# Patient Record
Sex: FEMALE | Race: WHITE | NOT HISPANIC OR LATINO | Employment: FULL TIME | URBAN - METROPOLITAN AREA
[De-identification: names, ages, dates, MRNs, and addresses within clinical notes are randomized per-mention and may not be internally consistent; named-entity substitution may affect disease eponyms.]

---

## 2017-05-22 ENCOUNTER — HOSPITAL ENCOUNTER (EMERGENCY)
Facility: CLINIC | Age: 27
Discharge: HOME | End: 2017-05-22
Attending: EMERGENCY MEDICINE | Admitting: EMERGENCY MEDICINE
Payer: COMMERCIAL

## 2017-05-22 VITALS
WEIGHT: 125 LBS | RESPIRATION RATE: 16 BRPM | HEIGHT: 61 IN | DIASTOLIC BLOOD PRESSURE: 78 MMHG | TEMPERATURE: 97.9 F | HEART RATE: 70 BPM | OXYGEN SATURATION: 99 % | BODY MASS INDEX: 23.6 KG/M2 | SYSTOLIC BLOOD PRESSURE: 118 MMHG

## 2017-05-22 DIAGNOSIS — R10.13 MIDEPIGASTRIC PAIN: ICD-10-CM

## 2017-05-22 LAB
ALBUMIN SERPL-MCNC: 3.9 G/DL (ref 3.4–5)
ALP SERPL-CCNC: 60 U/L (ref 40–150)
ALT SERPL W P-5'-P-CCNC: 17 U/L (ref 0–50)
ANION GAP SERPL CALCULATED.3IONS-SCNC: 6 MMOL/L (ref 3–14)
AST SERPL W P-5'-P-CCNC: 14 U/L (ref 0–45)
BASOPHILS # BLD AUTO: 0 10E9/L (ref 0–0.2)
BASOPHILS NFR BLD AUTO: 0.4 %
BILIRUB SERPL-MCNC: 0.2 MG/DL (ref 0.2–1.3)
BUN SERPL-MCNC: 11 MG/DL (ref 7–30)
CALCIUM SERPL-MCNC: 8.8 MG/DL (ref 8.5–10.1)
CHLORIDE SERPL-SCNC: 107 MMOL/L (ref 94–109)
CO2 SERPL-SCNC: 27 MMOL/L (ref 20–32)
CREAT SERPL-MCNC: 0.63 MG/DL (ref 0.52–1.04)
DIFFERENTIAL METHOD BLD: NORMAL
EOSINOPHIL # BLD AUTO: 0.5 10E9/L (ref 0–0.7)
EOSINOPHIL NFR BLD AUTO: 5.3 %
ERYTHROCYTE [DISTWIDTH] IN BLOOD BY AUTOMATED COUNT: 12.8 % (ref 10–15)
GFR SERPL CREATININE-BSD FRML MDRD: NORMAL ML/MIN/1.7M2
GLUCOSE SERPL-MCNC: 90 MG/DL (ref 70–99)
HCT VFR BLD AUTO: 39.4 % (ref 35–47)
HGB BLD-MCNC: 13.2 G/DL (ref 11.7–15.7)
IMM GRANULOCYTES # BLD: 0 10E9/L (ref 0–0.4)
IMM GRANULOCYTES NFR BLD: 0.1 %
LIPASE SERPL-CCNC: 111 U/L (ref 73–393)
LYMPHOCYTES # BLD AUTO: 2.8 10E9/L (ref 0.8–5.3)
LYMPHOCYTES NFR BLD AUTO: 27.8 %
MCH RBC QN AUTO: 29.8 PG (ref 26.5–33)
MCHC RBC AUTO-ENTMCNC: 33.5 G/DL (ref 31.5–36.5)
MCV RBC AUTO: 89 FL (ref 78–100)
MONOCYTES # BLD AUTO: 0.7 10E9/L (ref 0–1.3)
MONOCYTES NFR BLD AUTO: 6.7 %
NEUTROPHILS # BLD AUTO: 6 10E9/L (ref 1.6–8.3)
NEUTROPHILS NFR BLD AUTO: 59.7 %
PLATELET # BLD AUTO: 265 10E9/L (ref 150–450)
POTASSIUM SERPL-SCNC: 3.9 MMOL/L (ref 3.4–5.3)
PROT SERPL-MCNC: 7.7 G/DL (ref 6.8–8.8)
RBC # BLD AUTO: 4.43 10E12/L (ref 3.8–5.2)
SODIUM SERPL-SCNC: 140 MMOL/L (ref 133–144)
WBC # BLD AUTO: 10.1 10E9/L (ref 4–11)

## 2017-05-22 PROCEDURE — 99283 EMERGENCY DEPT VISIT LOW MDM: CPT | Mod: 25

## 2017-05-22 PROCEDURE — 99284 EMERGENCY DEPT VISIT MOD MDM: CPT | Performed by: EMERGENCY MEDICINE

## 2017-05-22 PROCEDURE — 83690 ASSAY OF LIPASE: CPT | Performed by: EMERGENCY MEDICINE

## 2017-05-22 PROCEDURE — 25000132 ZZH RX MED GY IP 250 OP 250 PS 637: Performed by: EMERGENCY MEDICINE

## 2017-05-22 PROCEDURE — 85025 COMPLETE CBC W/AUTO DIFF WBC: CPT | Performed by: EMERGENCY MEDICINE

## 2017-05-22 PROCEDURE — 96360 HYDRATION IV INFUSION INIT: CPT

## 2017-05-22 PROCEDURE — 25000125 ZZHC RX 250: Performed by: EMERGENCY MEDICINE

## 2017-05-22 PROCEDURE — 80053 COMPREHEN METABOLIC PANEL: CPT | Performed by: EMERGENCY MEDICINE

## 2017-05-22 PROCEDURE — 25000128 H RX IP 250 OP 636: Performed by: EMERGENCY MEDICINE

## 2017-05-22 RX ORDER — SODIUM CHLORIDE 9 MG/ML
1000 INJECTION, SOLUTION INTRAVENOUS CONTINUOUS
Status: DISCONTINUED | OUTPATIENT
Start: 2017-05-22 | End: 2017-05-22 | Stop reason: HOSPADM

## 2017-05-22 RX ORDER — SUCRALFATE 1 G/1
1 TABLET ORAL 4 TIMES DAILY
Qty: 40 TABLET | Refills: 3 | Status: SHIPPED | OUTPATIENT
Start: 2017-05-22 | End: 2023-05-16

## 2017-05-22 RX ADMIN — LIDOCAINE HYDROCHLORIDE 30 ML: 20 SOLUTION ORAL; TOPICAL at 14:28

## 2017-05-22 RX ADMIN — SODIUM CHLORIDE 1000 ML: 9 INJECTION, SOLUTION INTRAVENOUS at 14:28

## 2017-05-22 ASSESSMENT — ENCOUNTER SYMPTOMS
ABDOMINAL PAIN: 1
FLANK PAIN: 0
FEVER: 0
CHILLS: 0
DIARRHEA: 0
VOMITING: 0

## 2017-05-22 NOTE — ED AVS SNAPSHOT
[facility] Emergency Department    [address] [facility] [address]    Phone:  [phone]    Fax:  [phone]                                       Pao Wood   MRN: [id number]    Department:  [facility] Emergency Department   Date of Visit:  5/22/2017           After Visit Summary Signature Page     I have received my discharge instructions, and my questions have been answered. I have discussed any challenges I see with this plan with the nurse or doctor.    ..........................................................................................................................................  Patient/Patient Representative Signature      ..........................................................................................................................................  Patient Representative Print Name and Relationship to Patient    ..................................................               ................................................  Date                                            Time    ..........................................................................................................................................  Reviewed by Signature/Title    ...................................................              ..............................................  Date                                                            Time

## 2017-05-22 NOTE — ED AVS SNAPSHOT
[facility] Emergency Department    [address] [place] [address]    Phone:  [phone]    Fax:  [phone]                                       Pao Wood   MRN: [id number]    Department:  [facility] Emergency Department   Date of Visit:  5/22/2017           Patient Information     Date Of Birth          1990        Your diagnoses for this visit were:     Midepigastric pain        You were seen by Sixto Salas MD.      Follow-up Information     Follow up with Clinic, [facility].    Why:  As needed    Contact information:    [address] [facility] [place] [address]  [phone]          Discharge Instructions         Epigastric Pain (Uncertain Cause)    Epigastric pain can be a sign of disease in the upper abdomen. Common causes include:    Acid reflux (stomach acid flowing up into the esophagus)    Gastritis (irritation of the stomach lining)    Peptic Ulcer Disease    Inflammation of the pancreas    Gallstone    Infection in the gallbladder  Pain may be dull or burning. It may spread upward to the chest or to the back. There may be other symptoms such as belching, bloating, cramps or hunger pains. There may be weight loss or poor appetite, nausea or vomiting.  Since the diagnosis of your pain is not certain yet, further tests may sometimes be needed. Sometimes the doctor will treat you for the most likely condition to see if there is improvement before doing further tests.  Home care  Medicines    Antacids help neutralize the normal acids in your stomach. Examples are Maalox, Mylanta, Rolaids, and Tums. If you don t like the liquid, you can also try a chewable one. You may find one works better than another for you. Overuse can cause diarrhea or constipation.    Acid blockers (H2 blockers) decrease acid production. Examples are cimetidine (Tagamet), famotidine (Pepcid) and ranitidine (Zantac).    Acid inhibitors (PPIs) decrease acid production in a  different way than the blockers. You may find they work better, but can take a little longer to take effect.  Examples are omeprazole (Prilosec), lansoprazole (Prevacid), pantoprazole (Protonix), rabeprazole (Aciphex), and esomeprazole (Nexium).    Take an antacid 30-60 minutes after eating and at bedtime, but not at the same time as an acid blocker.    Try not to take NSAIDs. Aspirin may also cause problems, but if taking it for your heart or other medical reasons, talk to your doctor before stopping it; you do not want to cause a worse problem, like a heart attack or stroke.  Diet    If certain foods seem to cause your spasm, try to avoid them.     Eat slowly and chew food well before swallowing. Symptoms of gastritis can be worsened by certain foods. Limit or avoid fatty, fried, and spicy foods, as well as coffee, chocolate, mint, and foods with high acid content such as tomatoes and citrus fruit and juices (orange, grapefruit, lemon).    Avoid alcohol, caffeine, and tobacco, which can delay healing and worsen your problem.    Try eating smaller meals with snacks in between  Follow-up care  Follow up with your healthcare provider or as advised.  When to seek medical advice  Call your healthcare provider right away if any of the following occur:    Stomach pain worsens or moves to the right lower part of the abdomen    Chest pain appears, or if it worsens or spreads to the chest, back, neck, shoulder, or arm    Frequent vomiting (can t keep down liquids)    Blood in the stool or vomit (red or black color)    Feeling weak or dizzy, fainting, or having trouble breathing    Fever of 100.4 F (38 C) or higher, or as directed by your healthcare provider    Abdominal swelling                6319-4761 [employer]. [address] [place] [address], [place] [address]. All rights reserved. This information is not intended as a substitute for professional medical care. Always follow your healthcare professional's  instructions.          24 Hour Appointment Hotline       To make an appointment at any [facility], call [phone] ([phone]). If you don't have a family doctor or clinic, we will help you find one. [facility] are conveniently located to serve the needs of you and your family.             Review of your medicines      START taking        Dose / Directions Last dose taken    omeprazole 20 MG CR capsule   Commonly known as:  priLOSEC   Dose:  20 mg   Quantity:  30 capsule        Take 1 capsule (20 mg) by mouth daily   Refills:  0        sucralfate 1 GM tablet   Commonly known as:  CARAFATE   Dose:  1 g   Quantity:  40 tablet        Take 1 tablet (1 g) by mouth 4 times daily   Refills:  3          Our records show that you are taking the medicines listed below. If these are incorrect, please call your family doctor or clinic.        Dose / Directions Last dose taken    CELEXA PO   Dose:  20 mg        Take 20 mg by mouth daily   Refills:  0                Prescriptions were sent or printed at these locations (2 Prescriptions)                   [facility] [address] [address]    Telephone:  [phone]   Fax:  [phone]   Hours:                  E-Prescribed (2 of 2)         omeprazole (PRILOSEC) 20 MG CR capsule               sucralfate (CARAFATE) 1 GM tablet                Procedures and tests performed during your visit     CBC with platelets differential    Comprehensive metabolic panel    Lipase      Orders Needing Specimen Collection     None      Pending Results     No orders found from 5/20/2017 to 5/23/2017.            Pending Culture Results     No orders found from 5/20/2017 to 5/23/2017.            Pending Results Instructions     If you had any lab results that were not finalized at the time of your Discharge, you can call the ED Lab Result RN at [phone]. You will be contacted by this team for any positive Lab  results or changes in treatment. The nurses are available 7 days a week from 10A to 6:30P.  You can leave a message 24 hours per day and they will return your call.        Test Results From Your Hospital Stay        5/22/2017  3:05 PM      Component Results     Component Value Ref Range & Units Status    WBC 10.1 4.0 - 11.0 10e9/L Final    RBC Count 4.43 3.8 - 5.2 10e12/L Final    Hemoglobin 13.2 11.7 - 15.7 g/dL Final    Hematocrit 39.4 35.0 - 47.0 % Final    MCV 89 78 - 100 fl Final    MCH 29.8 26.5 - 33.0 pg Final    MCHC 33.5 31.5 - 36.5 g/dL Final    RDW 12.8 10.0 - 15.0 % Final    Platelet Count 265 150 - 450 10e9/L Final    Diff Method Automated Method  Final    % Neutrophils 59.7 % Final    % Lymphocytes 27.8 % Final    % Monocytes 6.7 % Final    % Eosinophils 5.3 % Final    % Basophils 0.4 % Final    % Immature Granulocytes 0.1 % Final    Absolute Neutrophil 6.0 1.6 - 8.3 10e9/L Final    Absolute Lymphocytes 2.8 0.8 - 5.3 10e9/L Final    Absolute Monocytes 0.7 0.0 - 1.3 10e9/L Final    Absolute Eosinophils 0.5 0.0 - 0.7 10e9/L Final    Absolute Basophils 0.0 0.0 - 0.2 10e9/L Final    Abs Immature Granulocytes 0.0 0 - 0.4 10e9/L Final         5/22/2017  3:19 PM      Component Results     Component Value Ref Range & Units Status    Sodium 140 133 - 144 mmol/L Final    Potassium 3.9 3.4 - 5.3 mmol/L Final    Chloride 107 94 - 109 mmol/L Final    Carbon Dioxide 27 20 - 32 mmol/L Final    Anion Gap 6 3 - 14 mmol/L Final    Glucose 90 70 - 99 mg/dL Final    Urea Nitrogen 11 7 - 30 mg/dL Final    Creatinine 0.63 0.52 - 1.04 mg/dL Final    GFR Estimate >90  Non  GFR Calc   >60 mL/min/1.7m2 Final    GFR Estimate If Black >90   GFR Calc   >60 mL/min/1.7m2 Final    Calcium 8.8 8.5 - 10.1 mg/dL Final    Bilirubin Total 0.2 0.2 - 1.3 mg/dL Final    Albumin 3.9 3.4 - 5.0 g/dL Final    Protein Total 7.7 6.8 - 8.8 g/dL Final    Alkaline Phosphatase 60 40 - 150 U/L Final    ALT 17 0 - 50 U/L  "Final    AST 14 0 - 45 U/L Final         2017  3:18 PM      Component Results     Component Value Ref Range & Units Status    Lipase 111 73 - 393 U/L Final                Thank you for choosing [facility]       Thank you for choosing [facility] for your care. Our goal is always to provide you with excellent care. Hearing back from our patients is one way we can continue to improve our services. Please take a few minutes to complete the written survey that you may receive in the mail after you visit with us. Thank you!        [employer] Information     [employer] lets you send messages to your doctor, view your test results, renew your prescriptions, schedule appointments and more. To sign up, go to [email or web address] . Click on \"Log in\" on the left side of the screen, which will take you to the Welcome page. Then click on \"Sign up Now\" on the right side of the page.     You will be asked to enter the access code listed below, as well as some personal information. Please follow the directions to create your username and password.     Your access code is: [email or web address]  Expires: 2017  3:31 PM     Your access code will  in 90 days. If you need help or a new code, please call your [facility] clinic or [phone].        Care EveryWhere ID     This is your Care EveryWhere ID. This could be used by other organizations to access your [facility] medical records  [id number]        After Visit Summary       This is your record. Keep this with you and show to your community pharmacist(s) and doctor(s) at your next visit.                  "

## 2017-05-22 NOTE — DISCHARGE INSTRUCTIONS
Epigastric Pain (Uncertain Cause)    Epigastric pain can be a sign of disease in the upper abdomen. Common causes include:    Acid reflux (stomach acid flowing up into the esophagus)    Gastritis (irritation of the stomach lining)    Peptic Ulcer Disease    Inflammation of the pancreas    Gallstone    Infection in the gallbladder  Pain may be dull or burning. It may spread upward to the chest or to the back. There may be other symptoms such as belching, bloating, cramps or hunger pains. There may be weight loss or poor appetite, nausea or vomiting.  Since the diagnosis of your pain is not certain yet, further tests may sometimes be needed. Sometimes the doctor will treat you for the most likely condition to see if there is improvement before doing further tests.  Home care  Medicines    Antacids help neutralize the normal acids in your stomach. Examples are Maalox, Mylanta, Rolaids, and Tums. If you don t like the liquid, you can also try a chewable one. You may find one works better than another for you. Overuse can cause diarrhea or constipation.    Acid blockers (H2 blockers) decrease acid production. Examples are cimetidine (Tagamet), famotidine (Pepcid) and ranitidine (Zantac).    Acid inhibitors (PPIs) decrease acid production in a different way than the blockers. You may find they work better, but can take a little longer to take effect.  Examples are omeprazole (Prilosec), lansoprazole (Prevacid), pantoprazole (Protonix), rabeprazole (Aciphex), and esomeprazole (Nexium).    Take an antacid 30-60 minutes after eating and at bedtime, but not at the same time as an acid blocker.    Try not to take NSAIDs. Aspirin may also cause problems, but if taking it for your heart or other medical reasons, talk to your doctor before stopping it; you do not want to cause a worse problem, like a heart attack or stroke.  Diet    If certain foods seem to cause your spasm, try to avoid them.     Eat slowly and chew food well  before swallowing. Symptoms of gastritis can be worsened by certain foods. Limit or avoid fatty, fried, and spicy foods, as well as coffee, chocolate, mint, and foods with high acid content such as tomatoes and citrus fruit and juices (orange, grapefruit, lemon).    Avoid alcohol, caffeine, and tobacco, which can delay healing and worsen your problem.    Try eating smaller meals with snacks in between  Follow-up care  Follow up with your healthcare provider or as advised.  When to seek medical advice  Call your healthcare provider right away if any of the following occur:    Stomach pain worsens or moves to the right lower part of the abdomen    Chest pain appears, or if it worsens or spreads to the chest, back, neck, shoulder, or arm    Frequent vomiting (can t keep down liquids)    Blood in the stool or vomit (red or black color)    Feeling weak or dizzy, fainting, or having trouble breathing    Fever of 100.4 F (38 C) or higher, or as directed by your healthcare provider    Abdominal swelling                8289-7671 [employer]. [address] [place] [address], [place] [address]. All rights reserved. This information is not intended as a substitute for professional medical care. Always follow your healthcare professional's instructions.

## 2017-05-22 NOTE — ED PROVIDER NOTES
History     Chief Complaint   Patient presents with     Abdominal Pain     upper abd      HPI  Pao Wood is a 27 year old female with a history of acid reflux and kidney infections who presents with abdominal pain. The patient developed abdominal pain two days ago with shortness of breath that is aggravated with deep breathes. Ibuprofen and Zantac doesn't aggravate or alleviate her symptoms. She has had a cholecystectomy but the sensation she is having mimics the same pain she had from her gallbladder in the past.   The patient denies vomiting, diarrhea, and flank pain. No fevers chills or shakes. She drinks a lot of caffeine daily and smokes approximately 10 cigarettes a day but denies any alcohol use. No chances of pregnancy. Family history of GERD and gastritis.       History reviewed. No pertinent past medical history.  There is no problem list on file for this patient.    Current Facility-Administered Medications   Medication     0.9% sodium chloride infusion     Current Outpatient Prescriptions   Medication     Citalopram Hydrobromide (CELEXA PO)     omeprazole (PRILOSEC) 20 MG CR capsule     sucralfate (CARAFATE) 1 GM tablet        Allergies   Allergen Reactions     Acetaminophen Nausea     Fever and Nausea     Aspirin Nausea     Nausea and Fever     Social History     Social History     Marital status: Single     Spouse name: N/A     Number of children: N/A     Years of education: N/A     Occupational History     Not on file.     Social History Main Topics     Smoking status: Current Every Day Smoker     Packs/day: 0.50     Smokeless tobacco: Not on file     Alcohol use No     Drug use: No     Sexual activity: Not on file     Other Topics Concern     Not on file     Social History Narrative     No family history on file.    I have reviewed the Medications, Allergies, Past Medical and Surgical History, and Social History in the Epic system.    Review of Systems   Constitutional: Negative for chills and  "fever.   Gastrointestinal: Positive for abdominal pain. Negative for diarrhea and vomiting.   Genitourinary: Negative for flank pain.     All other systems reviewed and are negative.    Physical Exam   BP: 113/80  Pulse: 74  Temp: 97.9  F (36.6  C)  Resp: 16  Height: 154.9 cm (5' 1\")  Weight: 56.7 kg (125 lb)    Physical Exam Gen. alert cooperative female in mild to moderate distress.  HEENT shows no scleral icterus.  Mucous is moist.  Neck is supple.  Lungs are clear without adventitious sounds.  Cardiac regular without murmur.  Back there is no CVA tenderness.  Abdomen reveals active bowel sounds on palpation shows tenderness in the epigastrium without guarding or rebound.  No organomegaly or masses.  She has intact surgical incisions.  Skin rash from the flank or abdomen.  Legs show no swelling, calf or thigh tenderness, and Homans is negative.    ED Course     ED Course     Procedures             Critical Care time:  none               Labs Ordered and Resulted from Time of ED Arrival Up to the Time of Departure from the ED   CBC WITH PLATELETS DIFFERENTIAL   COMPREHENSIVE METABOLIC PANEL   LIPASE       Results for orders placed or performed during the hospital encounter of 05/22/17 (from the past 24 hour(s))   CBC with platelets differential   Result Value Ref Range    WBC 10.1 4.0 - 11.0 10e9/L    RBC Count 4.43 3.8 - 5.2 10e12/L    Hemoglobin 13.2 11.7 - 15.7 g/dL    Hematocrit 39.4 35.0 - 47.0 %    MCV 89 78 - 100 fl    MCH 29.8 26.5 - 33.0 pg    MCHC 33.5 31.5 - 36.5 g/dL    RDW 12.8 10.0 - 15.0 %    Platelet Count 265 150 - 450 10e9/L    Diff Method Automated Method     % Neutrophils 59.7 %    % Lymphocytes 27.8 %    % Monocytes 6.7 %    % Eosinophils 5.3 %    % Basophils 0.4 %    % Immature Granulocytes 0.1 %    Absolute Neutrophil 6.0 1.6 - 8.3 10e9/L    Absolute Lymphocytes 2.8 0.8 - 5.3 10e9/L    Absolute Monocytes 0.7 0.0 - 1.3 10e9/L    Absolute Eosinophils 0.5 0.0 - 0.7 10e9/L    Absolute Basophils " 0.0 0.0 - 0.2 10e9/L    Abs Immature Granulocytes 0.0 0 - 0.4 10e9/L   Comprehensive metabolic panel   Result Value Ref Range    Sodium 140 133 - 144 mmol/L    Potassium 3.9 3.4 - 5.3 mmol/L    Chloride 107 94 - 109 mmol/L    Carbon Dioxide 27 20 - 32 mmol/L    Anion Gap 6 3 - 14 mmol/L    Glucose 90 70 - 99 mg/dL    Urea Nitrogen 11 7 - 30 mg/dL    Creatinine 0.63 0.52 - 1.04 mg/dL    GFR Estimate >90  Non  GFR Calc   >60 mL/min/1.7m2    GFR Estimate If Black >90   GFR Calc   >60 mL/min/1.7m2    Calcium 8.8 8.5 - 10.1 mg/dL    Bilirubin Total 0.2 0.2 - 1.3 mg/dL    Albumin 3.9 3.4 - 5.0 g/dL    Protein Total 7.7 6.8 - 8.8 g/dL    Alkaline Phosphatase 60 40 - 150 U/L    ALT 17 0 - 50 U/L    AST 14 0 - 45 U/L   Lipase   Result Value Ref Range    Lipase 111 73 - 393 U/L       Medications   0.9% sodium chloride BOLUS (1,000 mLs Intravenous New Bag 5/22/17 1428)     Followed by   0.9% sodium chloride infusion (not administered)   lidocaine (XYLOCAINE) 2 % 15 mL, alum & mag hydroxide-simethicone (MYLANTA ES/MAALOX  ES) 15 mL GI Cocktail (30 mLs Oral Given 5/22/17 1428)       2:00 PM patient assessed.   IV was established and blood was obtained.  Patient was given a GI cocktail.  2:50 PM on recheck patient did have some improvement with the GI cocktail.  Awaiting blood work.  Blood work was unremarkable.  Assessments & Plan (with Medical Decision Making)   Patient is a 26 yo female presents with midepigastric abdominal pain at times radiates to her back.  She states this feels very similar to when she had her gallstones.  She had her gallbladder removed.  She denies other abdominal surgeries except tubal ligation.  She said 3 deliveries.  Currently has no fever or chills.  No chest pain or shortness of breath.  She does state her to take a deep breath.  She's had no cough.  No diarrhea or dark stools.  No vomiting.  Does admit to gastric irritants including ibuprofen, caffeine, and tobacco.   Denies alcohol use.  He is never had endoscopy or colonoscopy.  She tried Tums in the above-mentioned ibuprofen without change in her symptoms.  No change with food intake.  Patient had blood work obtained showed no evidence of pancreatitis, hepatitis or biliary disease.  Normal hemoglobin and white count.  Suspect GERD, gastritis, early ulcer.  Asked her to avoid gastric irritants.  Prilosec 1 tablet daily and a stomach.  Carafate before meals and at bedtime.  Do not take these 2 medicines together.  He is to return to the emergency room for reassessment discussed including uncontrolled pain, vomiting, hematemesis, or dark/bloody stools.  I have reviewed the nursing notes.    I have reviewed the findings, diagnosis, plan and need for follow up with the patient.    New Prescriptions    OMEPRAZOLE (PRILOSEC) 20 MG CR CAPSULE    Take 1 capsule (20 mg) by mouth daily    SUCRALFATE (CARAFATE) 1 GM TABLET    Take 1 tablet (1 g) by mouth 4 times daily       Final diagnoses:   Midepigastric pain     This document serves as a record of the services and decisions personally performed and made by Sixto Salas MD. It was created on HIS/HER behalf by Lety Latham, a trained medical scribe. The creation of this document is based the provider's statements to the medical scribe.  Lety Latham 1:58 PM 5/22/2017    Provider:   The information in this document, created by the medical scribe for me, accurately reflects the services I personally performed and the decisions made by me. I have reviewed and approved this document for accuracy prior to leaving the patient care area.  Sixto Salas MD 1:58 PM 5/22/2017 5/22/2017   [facility] EMERGENCY DEPARTMENT     Sixto Salas MD  05/22/17 0870

## 2017-05-22 NOTE — ED NOTES
Pt states for the last 2-3 days she has had epigastric pain that radiates to the back.  No n/v/d.  No fevers.  Had gallbladder removed 2 years ago.

## 2019-06-21 ENCOUNTER — HOSPITAL ENCOUNTER (EMERGENCY)
Facility: CLINIC | Age: 29
Discharge: HOME | End: 2019-06-21
Attending: NURSE PRACTITIONER | Admitting: NURSE PRACTITIONER
Payer: COMMERCIAL

## 2019-06-21 VITALS
WEIGHT: 130 LBS | RESPIRATION RATE: 18 BRPM | DIASTOLIC BLOOD PRESSURE: 80 MMHG | HEART RATE: 70 BPM | OXYGEN SATURATION: 100 % | HEIGHT: 62 IN | SYSTOLIC BLOOD PRESSURE: 118 MMHG | BODY MASS INDEX: 23.92 KG/M2 | TEMPERATURE: 97.2 F

## 2019-06-21 DIAGNOSIS — H10.33 ACUTE CONJUNCTIVITIS OF BOTH EYES, UNSPECIFIED ACUTE CONJUNCTIVITIS TYPE: ICD-10-CM

## 2019-06-21 DIAGNOSIS — H61.21 IMPACTED CERUMEN OF RIGHT EAR: ICD-10-CM

## 2019-06-21 PROCEDURE — G0463 HOSPITAL OUTPT CLINIC VISIT: HCPCS | Mod: 25 | Performed by: NURSE PRACTITIONER

## 2019-06-21 PROCEDURE — 99214 OFFICE O/P EST MOD 30 MIN: CPT | Mod: Z6 | Performed by: NURSE PRACTITIONER

## 2019-06-21 PROCEDURE — 69209 REMOVE IMPACTED EAR WAX UNI: CPT | Mod: RT | Performed by: NURSE PRACTITIONER

## 2019-06-21 RX ORDER — CITALOPRAM HYDROBROMIDE 20 MG/1
20 TABLET ORAL
COMMUNITY
Start: 2018-08-14 | End: 2019-06-21

## 2019-06-21 RX ORDER — OFLOXACIN 3 MG/ML
1-2 SOLUTION/ DROPS OPHTHALMIC
Qty: 1 BOTTLE | Refills: 0 | Status: SHIPPED | OUTPATIENT
Start: 2019-06-21 | End: 2020-07-02

## 2019-06-21 RX ORDER — IBUPROFEN 200 MG
600 TABLET ORAL
COMMUNITY
Start: 2019-04-24

## 2019-06-21 ASSESSMENT — MIFFLIN-ST. JEOR: SCORE: 1272.93

## 2019-06-21 NOTE — ED AVS SNAPSHOT
[facility] Emergency Department  [address] [address]  Phone:  [phone]  Fax:  [phone]                                    Pao Wood   MRN: [id number]    Department:  [facility] Emergency Department   Date of Visit:  6/21/2019           After Visit Summary Signature Page    I have received my discharge instructions, and my questions have been answered. I have discussed any challenges I see with this plan with the nurse or doctor.    ..........................................................................................................................................  Patient/Patient Representative Signature      ..........................................................................................................................................  Patient Representative Print Name and Relationship to Patient    ..................................................               ................................................  Date                                   Time    ..........................................................................................................................................  Reviewed by Signature/Title    ...................................................              ..............................................  Date                                               Time          22EPIC Rev 08/18

## 2019-06-22 NOTE — ED PROVIDER NOTES
"  History     Chief Complaint   Patient presents with     Conjunctivitis     bilateral.  and R ear pain     HPI  Presents for evaluation for the following:     Eye(s) Problem      Duration: 1 days    Description:  Location: bilateral  Pain: no  Redness: YES  Discharge: YES- bilaterally but started on right and now progressed to left eye    Accompanying signs and symptoms: muffled hearing today and has felt plugged over the last week    History (Trauma, foreign body exposure,): None    Precipitating or alleviating factors (contact use): None    Does not wear contacts or glasses    Therapies tried and outcome: None       Allergies:  Allergies   Allergen Reactions     Acetaminophen Nausea     Fever and Nausea     Aspirin Nausea     Nausea and Fever       Problem List:    There are no active problems to display for this patient.       Past Medical History:    History reviewed. No pertinent past medical history.    Past Surgical History:    History reviewed. No pertinent surgical history.    Family History:    History reviewed. No pertinent family history.    Social History:  Marital Status:  Single [1]  Social History     Tobacco Use     Smoking status: Current Every Day Smoker     Packs/day: 0.50   Substance Use Topics     Alcohol use: No     Drug use: No        Medications:      ibuprofen (ADVIL/MOTRIN) 200 MG tablet   ofloxacin (OCUFLOX) 0.3 % ophthalmic solution   Citalopram Hydrobromide (CELEXA PO)   sucralfate (CARAFATE) 1 GM tablet         Review of Systems  Patient denies fever, aches, chills, sweats, throat pain, vision changes, chest pain, shortness of air, abdominal pain, nausea, vomiting, diarrhea, difficulty urinating, dysuria, dizziness, seizures, speech difficulty and thoughts of harming self.  As mentioned above in the history present illness. All other systems were reviewed and are negative.      Physical Exam   BP: 118/80  Pulse: 70  Temp: 97.2  F (36.2  C)  Resp: 18  Height: 157.5 cm (5' 2\")  Weight: " 59 kg (130 lb)  SpO2: 100 %      Physical Exam  GENERAL APPEARANCE: Alert, no acute distress  EYES: PERRL, EOM normal, conjunctival erythema bilateral, crusting, mattering present bilateral  HENT: Mouth and posterior oropharynx normal, moist mucous membranes, right TM not visualized secondary to cerumen, left TM normal  NECK: No adenopathy,masses or thyromegaly  RESP: lungs clear to auscultation bilaterally  CV: regular rate and rhythm, no murmur, rub or gallop  LYMPHATICS: No cervical, or supraclavicular adenopathy  SKIN: no suspicious lesions or rashes  PSYCHE: mentation appears normal, affect and mood normal    ED Course        Procedures  Cerumen removed from right ear by nurse without difficulty; post removal, TM and ear canal visualized and normal.  Pt reports ear feels normal again.    No results found for this or any previous visit (from the past 24 hour(s)).    Medications - No data to display    Assessments & Plan (with Medical Decision Making)  Pt presents with one day eye problem that started in right eye and has progressed to left eye also.  PT denies foreign body and contact lens use.  Pt denies contacts with similar symptoms.  Pt also has concern of right ear plugged ear sensation and decreased hearing without drainage or pain.  Exam as noted above.  Cerumen removed without complication.  Will treat as bacterial conjunctivitis.  Reviewed instructions for ofloxacin eyedrops.  Patient verbalized understanding regarding all of the above.  Patient discharged in stable condition peer     I have reviewed the nursing notes.    I have reviewed the findings, diagnosis, plan and need for follow up with the patient.       Medication List      Started    ofloxacin 0.3 % ophthalmic solution  Commonly known as:  OCUFLOX  1-2 drops, Both Eyes, EVERY 2 HOURS WHILE AWAKE            Final diagnoses:   Impacted cerumen of right ear   Acute conjunctivitis of both eyes, unspecified acute conjunctivitis type        6/21/2019   [facility] EMERGENCY DEPARTMENT     Shelby Mckee, APRN CNP  06/22/19 0428

## 2019-07-12 ENCOUNTER — APPOINTMENT (OUTPATIENT)
Dept: GENERAL RADIOLOGY | Facility: CLINIC | Age: 29
End: 2019-07-12
Attending: PHYSICIAN ASSISTANT
Payer: COMMERCIAL

## 2019-07-12 ENCOUNTER — HOSPITAL ENCOUNTER (EMERGENCY)
Facility: CLINIC | Age: 29
Discharge: HOME | End: 2019-07-12
Attending: PHYSICIAN ASSISTANT | Admitting: PHYSICIAN ASSISTANT
Payer: COMMERCIAL

## 2019-07-12 VITALS
DIASTOLIC BLOOD PRESSURE: 56 MMHG | RESPIRATION RATE: 16 BRPM | SYSTOLIC BLOOD PRESSURE: 105 MMHG | WEIGHT: 130 LBS | TEMPERATURE: 98.6 F | HEIGHT: 61 IN | BODY MASS INDEX: 24.55 KG/M2 | OXYGEN SATURATION: 98 %

## 2019-07-12 DIAGNOSIS — S69.91XA HAND INJURY, RIGHT, INITIAL ENCOUNTER: ICD-10-CM

## 2019-07-12 PROCEDURE — G0463 HOSPITAL OUTPT CLINIC VISIT: HCPCS | Performed by: PHYSICIAN ASSISTANT

## 2019-07-12 PROCEDURE — 99214 OFFICE O/P EST MOD 30 MIN: CPT | Mod: Z6 | Performed by: PHYSICIAN ASSISTANT

## 2019-07-12 PROCEDURE — 73130 X-RAY EXAM OF HAND: CPT | Mod: RT

## 2019-07-12 ASSESSMENT — MIFFLIN-ST. JEOR: SCORE: 1257.06

## 2019-07-12 NOTE — ED AVS SNAPSHOT
[facility] Emergency Department  [address] [address]  Phone:  [phone]  Fax:  [phone]                                    Pao Wood   MRN: [id number]    Department:  [facility] Emergency Department   Date of Visit:  7/12/2019           After Visit Summary Signature Page    I have received my discharge instructions, and my questions have been answered. I have discussed any challenges I see with this plan with the nurse or doctor.    ..........................................................................................................................................  Patient/Patient Representative Signature      ..........................................................................................................................................  Patient Representative Print Name and Relationship to Patient    ..................................................               ................................................  Date                                   Time    ..........................................................................................................................................  Reviewed by Signature/Title    ...................................................              ..............................................  Date                                               Time          22EPIC Rev 08/18

## 2019-07-12 NOTE — ED PROVIDER NOTES
History     Chief Complaint   Patient presents with     Hand Pain     right hand, punched a metal sign 2 weeks ago     HPI  Pao Wood is a 29 year old female who sustained a right hand injury 2 weeks ago.   Mechanism of injury: patient punched metal sign 2 weeks ago.   Immediate symptoms: immediate pain, immediate swelling, was able to use hand directly after injury, no deformity was noted by the patient, swelling and bruising improved, but pain persists.   Symptoms have been persistent since that time.   Prior history of related problems: no prior problems with this area in the past.  Patient states some tingling to the 5th finger. No numbness, weakness, abrasions or lacerations.     Problem list, Medication list, Allergies, and Medical/Social/Surgical histories reviewed in [facility] and updated as appropriate.      Allergies:  Allergies   Allergen Reactions     Acetaminophen Nausea     Fever and Nausea     Aspirin Nausea     Nausea and Fever       Problem List:    There are no active problems to display for this patient.       Past Medical History:    History reviewed. No pertinent past medical history.    Past Surgical History:    History reviewed. No pertinent surgical history.    Family History:    No family history on file.    Social History:  Marital Status:  Single [1]  Social History     Tobacco Use     Smoking status: Current Every Day Smoker     Packs/day: 0.50   Substance Use Topics     Alcohol use: No     Drug use: No        Medications:      Citalopram Hydrobromide (CELEXA PO)   ibuprofen (ADVIL/MOTRIN) 200 MG tablet   ofloxacin (OCUFLOX) 0.3 % ophthalmic solution   sucralfate (CARAFATE) 1 GM tablet         Review of Systems   Musculoskeletal:        Right hand pain to the lateral side of the hand and 5th finger.    Neurological: Negative for weakness and numbness.   All other systems reviewed and are negative.      Physical Exam   BP: 105/56  Heart Rate: 65  Temp: 98.6  F (37  C)  Resp: 16  Height:  "154.9 cm (5' 1\")  Weight: 59 kg (130 lb)  SpO2: 98 %      Physical Exam   Constitutional: She is oriented to person, place, and time. She appears well-developed and well-nourished. No distress.   Cardiovascular: Intact distal pulses.   Musculoskeletal:        Right wrist: Normal.        Right hand: She exhibits tenderness and bony tenderness (over lateral aspect of the right hand, 5th MCP joint and 5th finger. ). She exhibits normal range of motion, normal two-point discrimination, normal capillary refill, no deformity, no laceration and no swelling. Normal sensation noted. Normal strength noted.        Hands:  Patient with full range of motion of the hand and wrist with and without resistance in all fingers.  No bruising, swelling, skin abrasion or laceration noted and no erythema.   Neurological: She is alert and oriented to person, place, and time. She has normal strength. No sensory deficit. GCS eye subscore is 4. GCS verbal subscore is 5. GCS motor subscore is 6.   Skin: Skin is warm, dry and intact. Capillary refill takes less than 2 seconds. No abrasion, no bruising, no ecchymosis, no petechiae and no rash noted. She is not diaphoretic. No erythema.   Psychiatric: She has a normal mood and affect. Her behavior is normal. Judgment and thought content normal.   Nursing note and vitals reviewed.      ED Course        Procedures              Critical Care time:  none               Results for orders placed or performed during the hospital encounter of 07/12/19   XR Hand Right G/E 3 Views    Narrative    Examination:  XR HAND RT G/E 3 VW    Date:  7/12/2019 6:43 PM     Clinical Information: Subacute trauma. Fifth metacarpal pain.     Comparison: None.    Findings: No fracture or subluxation. Normal joint spacing and  alignment. No intraosseous lesions. No soft tissue abnormality is  evident.      Impression    Impression: Unremarkable right hand..    BISHNU NAVA MD       Medications - No data to " display    Assessments & Plan (with Medical Decision Making)     I have reviewed the nursing notes.    I have reviewed the findings, diagnosis, plan and need for follow up with the patient.   29-year-old female presents the urgent care with persistent right hand pain after she punched a metal sign 2 weeks ago.  See exam findings above.  X-ray obtained in office today with no fracture or subluxation.  Normal joint spacing and alignment.  No intraosseous lesions.  No soft tissue abnormalities evident.  Patient informed of x-ray results and informed to ice, rest, elevate, Tylenol and ibuprofen over-the-counter as needed for pain.  Patient follow-up with primary care doctor or orthopedic specialist in 1 to 2 weeks if symptoms persist or fail to improve.  Patient return sooner if symptoms worsen or change these were discussed with patient and given in discharge paperwork.  Patient discharged in stable condition.       Medication List      There are no discharge medications for this visit.         Final diagnoses:   Hand injury, right, initial encounter       7/12/2019   [facility] EMERGENCY DEPARTMENT     Celsa Schneider PA-C  07/14/19 1151

## 2019-07-13 NOTE — DISCHARGE INSTRUCTIONS
Ice, rest, elevate, Tylenol and ibuprofen over-the-counter as needed for pain.    Patient follow-up with primary care doctor or orthopedic specialist in 1 to 2 weeks if symptoms persist or fail to improve.    X-ray negative for fracture.

## 2019-07-14 ASSESSMENT — ENCOUNTER SYMPTOMS
NUMBNESS: 0
WEAKNESS: 0

## 2020-07-02 ENCOUNTER — VIRTUAL VISIT (OUTPATIENT)
Dept: URGENT CARE | Facility: CLINIC | Age: 30
End: 2020-07-02
Payer: COMMERCIAL

## 2020-07-02 ENCOUNTER — NURSE TRIAGE (OUTPATIENT)
Dept: NURSING | Facility: CLINIC | Age: 30
End: 2020-07-02

## 2020-07-02 DIAGNOSIS — J06.9 UPPER RESPIRATORY TRACT INFECTION, UNSPECIFIED TYPE: ICD-10-CM

## 2020-07-02 DIAGNOSIS — J06.9 UPPER RESPIRATORY TRACT INFECTION, UNSPECIFIED TYPE: Primary | ICD-10-CM

## 2020-07-02 PROCEDURE — 99207 ZZC NO CHARGE NURSE ONLY: CPT

## 2020-07-02 PROCEDURE — U0003 INFECTIOUS AGENT DETECTION BY NUCLEIC ACID (DNA OR RNA); SEVERE ACUTE RESPIRATORY SYNDROME CORONAVIRUS 2 (SARS-COV-2) (CORONAVIRUS DISEASE [COVID-19]), AMPLIFIED PROBE TECHNIQUE, MAKING USE OF HIGH THROUGHPUT TECHNOLOGIES AS DESCRIBED BY CMS-2020-01-R: HCPCS | Performed by: PHYSICIAN ASSISTANT

## 2020-07-02 PROCEDURE — 99213 OFFICE O/P EST LOW 20 MIN: CPT | Mod: 95 | Performed by: PHYSICIAN ASSISTANT

## 2020-07-02 NOTE — LETTER
July 3, 2020        Pao Nina  [address] [address]    This letter provides a written record that you were tested for COVID-19 on 7/2/20.       Your result was negative. This means that we didn t find the virus that causes COVID-19 in your sample. A test may show negative when you do actually have the virus. This can happen when the virus is in the early stages of infection, before you feel illness symptoms.    If you have symptoms   Stay home and away from others (self-isolate) until you meet ALL of the guidelines below:    You ve had no fever--and no medicine that reduces fever--for 3 full days (72 hours). And      Your other symptoms have gotten better. For example, your cough or breathing has improved. And     At least 10 days have passed since your symptoms started.    During this time:    Stay home. Don t go to work, school or anywhere else.     Stay in your own room, including for meals. Use your own bathroom if you can.    Stay away from others in your home. No hugging, kissing or shaking hands. No visitors.    Clean  high touch  surfaces often (doorknobs, counters, handles, etc.). Use a household cleaning spray or wipes. You can find a full list on the EPA website at www.epa.gov/pesticide-registration/list-n-disinfectants-use-against-sars-cov-2.    Cover your mouth and nose with a mask, tissue or washcloth to avoid spreading germs.    Wash your hands and face often with soap and water.    Going back to work  Check with your employer for any guidelines to follow for going back to work.    Employers: This document serves as formal notice that your employee tested negative for COVID-19, as of the testing date shown above.

## 2020-07-02 NOTE — TELEPHONE ENCOUNTER
Pao has a cough and body aches, fever and shortness of breath when walking.  Symptoms started 3 to 4 days ago.      COVID 19 Nurse Triage Plan/Patient Instructions    Please be aware that novel coronavirus (COVID-19) may be circulating in the community. If you develop symptoms such as fever, cough, or SOB or if you have concerns about the presence of another infection including coronavirus (COVID-19), please contact your health care provider or visit [email or web address].     Disposition/Instructions    Patient to schedule a Virtual Visit with provider. Reference Visit Selection Guide.    Thank you for taking steps to prevent the spread of this virus.  o Limit your contact with others.  o Wear a simple mask to cover your cough.  o Wash your hands well and often.    Resources    [facility]: About COVID-19: [email or web address]    CDC: What to Do If You're Sick: www.cdc.gov/coronavirus/2019-ncov/about/steps-when-sick.html    CDC: Ending Home Isolation: www.cdc.gov/coronavirus/2019-ncov/hcp/disposition-in-home-patients.html     CDC: Caring for Someone: www.cdc.gov/coronavirus/2019-ncov/if-you-are-sick/care-for-someone.html     [facility]: Interim Guidance for Hospital Discharge to Home: www.[facility].mn.us/diseases/coronavirus/hcp/hospdischarge.pdf    [facility] clinical trials (COVID-19 research studies): clinicalaffairs.[facility]-clinical-trials     Below are the COVID-19 hotlines at the [facility] ([facility]). Interpreters are available.   o For health questions: Call 912-428-0084 or 1-205.768.3520 (7 a.m. to 7 p.m.)  o For questions about schools and childcare: Call 129-567-4608 or 1-372.306.9057 (7 a.m. to 7 p.m.)                       Reason for Disposition    Fever present > 3 days (72 hours)    Additional Information    Negative: SEVERE difficulty breathing (e.g., struggling for each breath, speaks in single words)    Negative: Difficult to awaken or acting confused  (e.g., disoriented, slurred speech)    Negative: Bluish (or gray) lips or face now    Negative: Shock suspected (e.g., cold/pale/clammy skin, too weak to stand, low BP, rapid pulse)    Negative: Sounds like a life-threatening emergency to the triager    Negative: SEVERE or constant chest pain or pressure (Exception: mild central chest pain, present only when coughing)    Negative: MODERATE difficulty breathing (e.g., speaks in phrases, SOB even at rest, pulse 100-120)    Negative: Patient sounds very sick or weak to the triager    Negative: MILD difficulty breathing (e.g., minimal/no SOB at rest, SOB with walking, pulse <100)    Negative: Chest pain or pressure    Negative: Fever > 103 F (39.4 C)    Negative: [1] Fever > 101 F (38.3 C) AND [2] age > 60    Negative: [1] Fever > 100.0 F (37.8 C) AND [2] bedridden (e.g., nursing home patient, CVA, chronic illness, recovering from surgery)    Negative: HIGH RISK patient (e.g., age > 64 years, diabetes, heart or lung disease, weak immune system)    Protocols used: CORONAVIRUS (COVID-19) DIAGNOSED OR [id number] 5.16.20

## 2020-07-02 NOTE — PROGRESS NOTES
"Pao Wood is a 30 year old female who is being evaluated via a billable telephone visit.      The patient has been notified of following:     \"This telephone visit will be conducted via a call between you and your physician/provider. We have found that certain health care needs can be provided without the need for a physical exam.  This service lets us provide the care you need with a short phone conversation.  If a prescription is necessary we can send it directly to your pharmacy.  If lab work is needed we can place an order for that and you can then stop by our lab to have the test done at a later time.    Telephone visits are billed at different rates depending on your insurance coverage. During this emergency period, for some insurers they may be billed the same as an in-person visit.  Please reach out to your insurance provider with any questions.    If during the course of the call the physician/provider feels a telephone visit is not appropriate, you will not be charged for this service.\"    Patient has given verbal consent for Telephone visit?  Yes    What phone number would you like to be contacted at? [phone]    How would you like to obtain your AVS? Mail a copy      Subjective   Pao Wood is a 30 year old female who presents via phone visit today for the following health issues:  HPI  RESPIRATORY SYMPTOMS    Duration: 2days    Description  Productive cough with mild shortness of breath but no wheezing or hemoptysis     Severity: moderate    Accompanying signs and symptoms:  Also reports rhinorrhea, sore throat, fever, fatigue/malaise and myalgias.  No abdominal pain, n/v, constipation, diarrhea, bloody or black tarry stools.      History (predisposing factors):  No ill contacts.  No pmh of asthma.  Smoker.     Precipitating or alleviating factors: None    Therapies tried and outcome:  rest and fluids OTC NSAID with some relief     4  There is no problem list on file for this patient.    No " past surgical history on file.    Social History     Tobacco Use     Smoking status: Current Every Day Smoker     Packs/day: 0.50   Substance Use Topics     Alcohol use: No     No family history on file.      Current Outpatient Medications   Medication Sig Dispense Refill     Citalopram Hydrobromide (CELEXA PO) Take 20 mg by mouth daily       ibuprofen (ADVIL/MOTRIN) 200 MG tablet Take 600 mg by mouth       sucralfate (CARAFATE) 1 GM tablet Take 1 tablet (1 g) by mouth 4 times daily 40 tablet 3     Allergies   Allergen Reactions     Acetaminophen Nausea     Fever and Nausea     Aspirin Nausea     Nausea and Fever       Reviewed and updated as needed this visit by Provider         Review of Systems   CONSTITUTIONAL: NEGATIVE for change in weight  INTEGUMENTARY/SKIN: NEGATIVE for worrisome rashes, moles or lesions  EYES: NEGATIVE for vision changes or irritation  BREAST: NEGATIVE for masses, tenderness or discharge  CV: NEGATIVE for chest pain, palpitations or peripheral edema  GI: NEGATIVE for nausea, abdominal pain, heartburn, or change in bowel habits  MUSCULOSKELETAL: NEGATIVE for significant arthralgias or myalgia  NEURO: NEGATIVE for weakness, dizziness or paresthesias  PSYCHIATRIC: NEGATIVE for changes in mood or affect       Objective   Reported vitals:  There were no vitals taken for this visit.   healthy, alert and no distress  PSYCH: Alert and oriented times 3; coherent speech, normal   rate and volume, able to articulate logical thoughts, able   to abstract reason, no tangential thoughts, no hallucinations   or delusions  Her affect is normal  RESP: No cough, no audible wheezing, able to talk in full sentences  Remainder of exam unable to be completed due to telephone visits          Assessment/Plan:  Upper respiratory tract infection, unspecified type:  Will send for COVID19 testing.  Tylenol as needed for pain/fever.  Recommend self quarantine until it has been at least 14days since onset of symptoms  with improvement and until she has been fever free for 3days without the use of anti-pyretics.   To the ER if worsening shortness of breath/wheezing, hemoptysis or chest pain.  -     Symptomatic COVID-19 Virus (Coronavirus) by PCR; Future      Phone call duration:  15 minutes    Cherie Salas PA-C

## 2020-07-03 LAB
SARS-COV-2 RNA SPEC QL NAA+PROBE: NOT DETECTED
SPECIMEN SOURCE: NORMAL

## 2020-11-16 ENCOUNTER — HOSPITAL ENCOUNTER (EMERGENCY)
Facility: CLINIC | Age: 30
Discharge: HOME | End: 2020-11-16
Attending: NURSE PRACTITIONER | Admitting: NURSE PRACTITIONER
Payer: COMMERCIAL

## 2020-11-16 VITALS
DIASTOLIC BLOOD PRESSURE: 70 MMHG | HEIGHT: 61 IN | SYSTOLIC BLOOD PRESSURE: 112 MMHG | HEART RATE: 68 BPM | OXYGEN SATURATION: 100 % | WEIGHT: 135 LBS | TEMPERATURE: 96.8 F | RESPIRATION RATE: 8 BRPM | BODY MASS INDEX: 25.49 KG/M2

## 2020-11-16 DIAGNOSIS — Z20.822 ENCOUNTER FOR LABORATORY TESTING FOR COVID-19 VIRUS: ICD-10-CM

## 2020-11-16 DIAGNOSIS — R68.89 FLU-LIKE SYMPTOMS: ICD-10-CM

## 2020-11-16 PROCEDURE — C9803 HOPD COVID-19 SPEC COLLECT: HCPCS | Performed by: NURSE PRACTITIONER

## 2020-11-16 PROCEDURE — 99283 EMERGENCY DEPT VISIT LOW MDM: CPT | Performed by: NURSE PRACTITIONER

## 2020-11-16 PROCEDURE — 250N000013 HC RX MED GY IP 250 OP 250 PS 637: Performed by: NURSE PRACTITIONER

## 2020-11-16 PROCEDURE — U0003 INFECTIOUS AGENT DETECTION BY NUCLEIC ACID (DNA OR RNA); SEVERE ACUTE RESPIRATORY SYNDROME CORONAVIRUS 2 (SARS-COV-2) (CORONAVIRUS DISEASE [COVID-19]), AMPLIFIED PROBE TECHNIQUE, MAKING USE OF HIGH THROUGHPUT TECHNOLOGIES AS DESCRIBED BY CMS-2020-01-R: HCPCS | Performed by: FAMILY MEDICINE

## 2020-11-16 PROCEDURE — 99284 EMERGENCY DEPT VISIT MOD MDM: CPT | Performed by: NURSE PRACTITIONER

## 2020-11-16 RX ORDER — ALBUTEROL SULFATE 90 UG/1
2 AEROSOL, METERED RESPIRATORY (INHALATION) EVERY 6 HOURS PRN
Qty: 1 INHALER | Refills: 0 | Status: SHIPPED | OUTPATIENT
Start: 2020-11-16 | End: 2023-05-16

## 2020-11-16 RX ADMIN — IBUPROFEN 600 MG: 400 TABLET ORAL at 11:11

## 2020-11-16 ASSESSMENT — MIFFLIN-ST. JEOR: SCORE: 1274.74

## 2020-11-16 NOTE — DISCHARGE INSTRUCTIONS
"Chief Complaint   Patient presents with     Prenatal Care   19w5d  Ultrasound done today : boy    Initial /66   Wt 72.9 kg (160 lb 11.2 oz)   LMP 10/17/2018 (Exact Date)   BMI 30.36 kg/m   Estimated body mass index is 30.36 kg/m  as calculated from the following:    Height as of 18: 1.549 m (5' 1\").    Weight as of this encounter: 72.9 kg (160 lb 11.2 oz).  BP completed using cuff size: regular    Questioned patient about current smoking habits.  Pt. has never smoked.          The following HM Due: NONE        Silvia Trent CMA             " Please sign up for my chart in order to get your test results efficiently

## 2020-11-16 NOTE — ED NOTES
Pt presents to ER with c/o HA and generalized body aches, onset this a.m. when pt awoke.  She is afebrile and in no acute distress.  No known covid contacts.  She reports her spouse is not feeling well either.

## 2020-11-16 NOTE — ED AVS SNAPSHOT
[facility] Emergency Dept  [address] [address]  Phone: [phone]  Fax: [phone]                                    Pao Wood   MRN: [id number]    Department: [facility] Emergency Dept   Date of Visit: 11/16/2020           After Visit Summary Signature Page    I have received my discharge instructions, and my questions have been answered. I have discussed any challenges I see with this plan with the nurse or doctor.    ..........................................................................................................................................  Patient/Patient Representative Signature      ..........................................................................................................................................  Patient Representative Print Name and Relationship to Patient    ..................................................               ................................................  Date                                   Time    ..........................................................................................................................................  Reviewed by Signature/Title    ...................................................              ..............................................  Date                                               Time          22EPIC Rev 08/18

## 2020-11-16 NOTE — ED PROVIDER NOTES
"  History     Chief Complaint   Patient presents with     Headache     woke with HA , \"body shakes\" nauseated, dry heaves, general weakness,      Nausea     HPI    SUBJECTIVE: Pao Wood  is here today because of:\"Flu\"  The patient has had symptoms of nausea, headache, chest congestion, decreased activity, fatigue and body aches (shakes).   Onset of symptoms was 1 day ago. Course of illness is worsening.  Patient denies exposure to illness at home or work.  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   Patient denies fever, cough, earache, sore throat, vomiting, diarrhea, wheezing and decreased appetite  Treatment measures tried include none.  Patient is exposed to tobacco  Pt reports occasional THC and last use two days ago.    Allergies:  Allergies   Allergen Reactions     Acetaminophen Nausea     Fever and Nausea     Aspirin Nausea     Nausea and Fever       Problem List:    There are no active problems to display for this patient.     Past Medical History:    No past medical history on file.    Past Surgical History:    No past surgical history on file.    Family History:    No family history on file.    Social History:  Marital Status:  Single [1]  Social History     Tobacco Use     Smoking status: Current Every Day Smoker     Packs/day: 0.50   Substance Use Topics     Alcohol use: No     Drug use: No        Medications:         albuterol (PROAIR HFA/PROVENTIL HFA/VENTOLIN HFA) 108 (90 Base) MCG/ACT inhaler       Citalopram Hydrobromide (CELEXA PO)       ibuprofen (ADVIL/MOTRIN) 200 MG tablet       sucralfate (CARAFATE) 1 GM tablet      Review of Systems  As mentioned above in the history present illness. All other systems were reviewed and are negative.    Physical Exam   BP: 102/68  Pulse: 88  Temp: 96.8  F (36  C)  Resp: 18  Height: 154.9 cm (5' 1\")  Weight: 61.2 kg (135 lb)  SpO2: 98 %      Physical Exam  GENERAL: alert, no acute distress and no apparent distress  EYES:  Right conjunctiva is not " injected and without discharge.  Left conjunctiva is not injected and without discharge.  EARS: Right TM is normal: no effusions, no erythema, and normal landmarks.  Left TM is normal: no effusions, no erythema, and normal landmarks.  NOSE: Nasal mucosa is normal.  Sinus not tender.  THROAT: normal posterior pharynx, uvula midline, tonsillar hypertrophy absent.  NECK: supple with no adenopathy  CARDIAC:NORMAL - regular rate and rhythm without murmur.  RESP: Normal -mild expiratory wheezing in upper lobes without accessory muscle use.  Patient is not hypoxic nor tachypneic..  SKIN: normal    ED Course        Procedures    No results found for this or any previous visit (from the past 24 hour(s)).    Medications   ibuprofen (ADVIL/MOTRIN) tablet 600 mg (600 mg Oral Given 11/16/20 1111)       Assessments & Plan (with Medical Decision Making)     I have reviewed the nursing notes.    I have reviewed the findings, diagnosis, plan and need for follow up with the patient.  Medical Decision Making:  CXR is not indicated.  Rapid Strep test is not indicated.  Covid indicated and result pending    Assessment:  1) suspect viral illness cannot exclude Covid.    PLAN:  Covid precautions reviewed.  Albuterol inhaler prescribed due to wheezing.  Patient reports history of sports induced asthma as a teenager.  Recommend smoking cessation note provided for work.  Due to wheezing albuterol inhaler prescribed.  Discussed ibuprofen for body aches.  Discussed worrisome reasons with which to return.  Patient verbalized understanding and discharged in stable condition.      New Prescriptions    ALBUTEROL (PROAIR HFA/PROVENTIL HFA/VENTOLIN HFA) 108 (90 BASE) MCG/ACT INHALER    Inhale 2 puffs into the lungs every 6 hours as needed for shortness of breath / dyspnea or wheezing       Final diagnoses:   Flu-like symptoms   Encounter for laboratory testing for COVID-19 virus       11/16/2020   [facility] EMERGENCY DEPT     Rafi  Shelby Espinal, APRN CNP  11/16/20 1120

## 2020-11-16 NOTE — LETTER
November 16, 2020      To Whom It May Concern:      Pao Wood was seen in our Emergency Department today, 11/16/20.  Patient presents with flulike symptoms or viral illness-like symptoms.  Covid cannot be excluded.  Covid testing obtained today and result is pending.  Please excuse patient from work until results are made known.    Sincerely,        GENTRY Alvarado CNP

## 2020-11-17 LAB
SARS-COV-2 RNA SPEC QL NAA+PROBE: NOT DETECTED
SPECIMEN SOURCE: NORMAL

## 2020-12-20 ENCOUNTER — HEALTH MAINTENANCE LETTER (OUTPATIENT)
Age: 30
End: 2020-12-20

## 2021-10-03 ENCOUNTER — HEALTH MAINTENANCE LETTER (OUTPATIENT)
Age: 31
End: 2021-10-03

## 2022-01-23 ENCOUNTER — HEALTH MAINTENANCE LETTER (OUTPATIENT)
Age: 32
End: 2022-01-23

## 2022-09-11 ENCOUNTER — HEALTH MAINTENANCE LETTER (OUTPATIENT)
Age: 32
End: 2022-09-11

## 2023-04-30 ENCOUNTER — HEALTH MAINTENANCE LETTER (OUTPATIENT)
Age: 33
End: 2023-04-30

## 2023-05-09 ENCOUNTER — OFFICE VISIT (OUTPATIENT)
Dept: AUDIOLOGY | Facility: CLINIC | Age: 33
End: 2023-05-09
Payer: COMMERCIAL

## 2023-05-09 DIAGNOSIS — H93.19 TINNITUS: ICD-10-CM

## 2023-05-09 DIAGNOSIS — H91.93 BILATERAL HIGH FREQUENCY HEARING LOSS: Primary | ICD-10-CM

## 2023-05-09 PROCEDURE — 92557 COMPREHENSIVE HEARING TEST: CPT | Performed by: AUDIOLOGIST

## 2023-05-09 PROCEDURE — 92550 TYMPANOMETRY & REFLEX THRESH: CPT | Performed by: AUDIOLOGIST

## 2023-05-09 ASSESSMENT — ENCOUNTER SYMPTOMS
BREAST MASS: 0
DYSURIA: 0
DIZZINESS: 0
DIARRHEA: 0
HEADACHES: 0
SORE THROAT: 0
CHILLS: 0
PARESTHESIAS: 0
FEVER: 0
ARTHRALGIAS: 1
COUGH: 0
NERVOUS/ANXIOUS: 0
HEMATURIA: 0
FREQUENCY: 0
CONSTIPATION: 0
SHORTNESS OF BREATH: 0
JOINT SWELLING: 1
HEARTBURN: 0
MYALGIAS: 0
HEMATOCHEZIA: 0
WEAKNESS: 0
NAUSEA: 0
EYE PAIN: 0
ABDOMINAL PAIN: 0

## 2023-05-09 NOTE — PROGRESS NOTES
AUDIOLOGY REPORT    SUBJECTIVE:  Pao Wood is a 33 year old female who was seen in the Audiology Clinic at the [facility] for audiologic evaluation, referred by self  .No previous audiograms are available at today's appointment.  The patient reports a history of intermittent right ear tinnitus and possible hearing loss. She notes  noise exposure. The patient denies  bilateral otalgia and bilateral drainage.  The patient notes difficulty with communication in difficult  listening situations.  They were accompanied today by their self.    OBJECTIVE:  Abuse Screening:  Do you feel unsafe at home or work/school? No  Do you feel threatened by someone? No  Does anyone try to keep you from having contact with others, or doing things outside of your home? No  Physical signs of abuse present? No     Fall Risk Screen:  1. Have you fallen two or more times in the past year? No  2. Have you fallen and had an injury in the past year? No      Otoscopic exam indicates partial obstruction with cerumen bilaterally     Pure Tone Thresholds assessed using conventional audiometry with good  reliability from 250-8000 Hz bilaterally using insert earphones and circumaural headphones     RIGHT:  normal sloping to borderline-normal high frequency hearing loss    LEFT:    normal sloping to mild high frequency hearing loss    Tympanogram:    RIGHT: normal eardrum mobility    LEFT:   normal eardrum mobility    Reflexes (reported by stimulus ear):  RIGHT: Ipsilateral is present at normal levels  RIGHT: Contralateral is present at normal levels  LEFT:   Ipsilateral is present at normal levels  LEFT:   Contralateral is present at normal levels      Speech Reception Threshold:    RIGHT: 10 dB HL    LEFT:   10 dB HL  Word Recognition Score:     RIGHT: 96% at 50 dB HL using NU-6 recorded word list.    LEFT:   100% at 50 dB HL using NU-6 recorded word list.      ASSESSMENT:     ICD-10-CM    1. Bilateral high  frequency hearing loss  H91.93       2. Tinnitus  H93.19         Today s results were discussed with the patient in detail.     PLAN:  Patient was counseled regarding hearing loss and impact on communication.   Reviewed need for ear protection when exposed to occupational and recreational noise.  It is recommended that the patient return for bi-annual hearing evaluations.  Please call this clinic with questions regarding these results or recommendations.        Rupa Haywood M.A. -[facility], #[id number]

## 2023-05-16 ENCOUNTER — OFFICE VISIT (OUTPATIENT)
Dept: FAMILY MEDICINE | Facility: CLINIC | Age: 33
End: 2023-05-16
Payer: COMMERCIAL

## 2023-05-16 VITALS
OXYGEN SATURATION: 98 % | HEART RATE: 90 BPM | HEIGHT: 61 IN | DIASTOLIC BLOOD PRESSURE: 68 MMHG | RESPIRATION RATE: 14 BRPM | TEMPERATURE: 98 F | BODY MASS INDEX: 33.61 KG/M2 | SYSTOLIC BLOOD PRESSURE: 110 MMHG | WEIGHT: 178 LBS

## 2023-05-16 DIAGNOSIS — Z00.00 ROUTINE GENERAL MEDICAL EXAMINATION AT A HEALTH CARE FACILITY: Primary | ICD-10-CM

## 2023-05-16 DIAGNOSIS — Z13.220 LIPID SCREENING: ICD-10-CM

## 2023-05-16 DIAGNOSIS — R41.840 IMPAIRED CONCENTRATION: ICD-10-CM

## 2023-05-16 DIAGNOSIS — Z11.59 NEED FOR HEPATITIS C SCREENING TEST: ICD-10-CM

## 2023-05-16 DIAGNOSIS — Z11.4 SCREENING FOR HIV (HUMAN IMMUNODEFICIENCY VIRUS): ICD-10-CM

## 2023-05-16 DIAGNOSIS — M25.40 JOINT SWELLING: ICD-10-CM

## 2023-05-16 DIAGNOSIS — R73.03 PREDIABETES: ICD-10-CM

## 2023-05-16 DIAGNOSIS — Z13.1 SCREENING FOR DIABETES MELLITUS: ICD-10-CM

## 2023-05-16 DIAGNOSIS — Z12.4 CERVICAL CANCER SCREENING: ICD-10-CM

## 2023-05-16 LAB
CHOLEST SERPL-MCNC: 201 MG/DL
CRP SERPL-MCNC: 10.94 MG/L
ERYTHROCYTE [SEDIMENTATION RATE] IN BLOOD BY WESTERGREN METHOD: 17 MM/HR (ref 0–20)
HBA1C MFR BLD: 5.7 % (ref 0–5.6)
HDLC SERPL-MCNC: 43 MG/DL
LDLC SERPL CALC-MCNC: 129 MG/DL
NONHDLC SERPL-MCNC: 158 MG/DL
TRIGL SERPL-MCNC: 145 MG/DL
TSH SERPL DL<=0.005 MIU/L-ACNC: 2.32 UIU/ML (ref 0.3–4.2)

## 2023-05-16 PROCEDURE — 86038 ANTINUCLEAR ANTIBODIES: CPT | Performed by: NURSE PRACTITIONER

## 2023-05-16 PROCEDURE — 36415 COLL VENOUS BLD VENIPUNCTURE: CPT | Performed by: NURSE PRACTITIONER

## 2023-05-16 PROCEDURE — 99214 OFFICE O/P EST MOD 30 MIN: CPT | Mod: 25 | Performed by: NURSE PRACTITIONER

## 2023-05-16 PROCEDURE — 85652 RBC SED RATE AUTOMATED: CPT | Performed by: NURSE PRACTITIONER

## 2023-05-16 PROCEDURE — 80061 LIPID PANEL: CPT | Performed by: NURSE PRACTITIONER

## 2023-05-16 PROCEDURE — 99395 PREV VISIT EST AGE 18-39: CPT | Performed by: NURSE PRACTITIONER

## 2023-05-16 PROCEDURE — 86140 C-REACTIVE PROTEIN: CPT | Performed by: NURSE PRACTITIONER

## 2023-05-16 PROCEDURE — 86431 RHEUMATOID FACTOR QUANT: CPT | Performed by: NURSE PRACTITIONER

## 2023-05-16 PROCEDURE — 83036 HEMOGLOBIN GLYCOSYLATED A1C: CPT | Performed by: NURSE PRACTITIONER

## 2023-05-16 PROCEDURE — 84443 ASSAY THYROID STIM HORMONE: CPT | Performed by: NURSE PRACTITIONER

## 2023-05-16 ASSESSMENT — ENCOUNTER SYMPTOMS
ABDOMINAL PAIN: 0
DIARRHEA: 0
BREAST MASS: 0
COUGH: 0
NAUSEA: 0
MYALGIAS: 0
HEADACHES: 0
FREQUENCY: 0
WEAKNESS: 0
NERVOUS/ANXIOUS: 0
DYSURIA: 0
PARESTHESIAS: 0
HEARTBURN: 0
JOINT SWELLING: 1
SHORTNESS OF BREATH: 0
FEVER: 0
EYE PAIN: 0
ARTHRALGIAS: 1
HEMATURIA: 0
HEMATOCHEZIA: 0
SORE THROAT: 0
CONSTIPATION: 0
DIZZINESS: 0
CHILLS: 0

## 2023-05-16 ASSESSMENT — PAIN SCALES - GENERAL: PAINLEVEL: NO PAIN (0)

## 2023-05-16 NOTE — PATIENT INSTRUCTIONS
Labs today for inflammation, auto-immune, rheumatoid arthritis, and thyroid as cause for joint pain and fatigue.    Preventive Health Recommendations  Female Ages 26 - 39  Yearly exam:   See your health care provider every year in order to  Review health changes.   Discuss preventive care.    Review your medicines if you your doctor has prescribed any.    You do not need a Pap test if your uterus was removed (hysterectomy) and you have not had cancer.  You should be tested each year for STDs (sexually transmitted diseases), if you're at risk.   Talk to your provider about how often to have your cholesterol checked.  If you are at risk for diabetes, you should have a diabetes test (fasting glucose).  Shots: Get a flu shot each year. Get a tetanus shot every 10 years.   Nutrition:   Eat at least 5 servings of fruits and vegetables each day.  Eat whole-grain bread, whole-wheat pasta and brown rice instead of white grains and rice.  Get adequate Calcium and Vitamin D.     Lifestyle  Exercise at least 150 minutes a week (30 minutes a day, 5 days of the week). This will help you control your weight and prevent disease.  Limit alcohol to one drink per day.  No smoking.   Wear sunscreen to prevent skin cancer.  See your dentist every six months for an exam and cleaning.

## 2023-05-16 NOTE — PROGRESS NOTES
"  Assessment & Plan     Routine general medical examination at a health care facility  Reviewed preventative health care handout.  Patient declines vaccination for pneumonia today.  She declines smoking cessation after discussion. Lipid and A1C drawn for screenings.  Patient had hysterectomy and does not require PAP screening.  Recommend follow-up in 1 year for annual preventative visit.  - REVIEW OF HEALTH MAINTENANCE PROTOCOL ORDERS    Joint swelling  Labs ordered for evaluation.  CRP is elevated otherwise labs are normal.  Recommend follow-up if any persistent or more frequently occurring symptoms. Treating conservatively for now.  See lab tab for message to patient.  - TSH with free T4 reflex; Future  - Anti Nuclear Lis IgG by IFA with Reflex; Future  - CRP, inflammation; Future  - ESR: Erythrocyte sedimentation rate; Future  - Rheumatoid factor; Future  - TSH with free T4 reflex  - Anti Nuclear Lis IgG by IFA with Reflex  - CRP, inflammation  - ESR: Erythrocyte sedimentation rate  - Rheumatoid factor    Lipid screening  - Lipid panel reflex to direct LDL Fasting; Future  - Lipid panel reflex to direct LDL Fasting    Screening for diabetes mellitus  - Hemoglobin A1c; Future  - Hemoglobin A1c    Prediabetes  Patient has 5.7% A1C.  Referral placed to diabetes.  - [facility] Adult Diabetes Educator Referral; Future    Impaired concentration  Mental health referral placed for ADHD evaluation.  - Adult Mental Health  Referral; Future    Nicotine/Tobacco Cessation:  She reports that she has been smoking cigarettes. She has a 7.50 pack-year smoking history. She does not have any smokeless tobacco history on file.  Nicotine/Tobacco Cessation Plan:   Information offered: Patient not interested at this time      BMI:   Estimated body mass index is 33.52 kg/m  as calculated from the following:    Height as of this encounter: 1.552 m (5' 1.1\").    Weight as of this encounter: 80.7 kg (178 lb).   Weight management plan: " Discussed healthy diet and exercise guidelines    See Patient Instructions    Corine Chaney NP  [facility] BRENT Cedeno is a 33 year old, presenting for the following health issues:  Physical        5/16/2023     2:16 PM   Additional Questions   Roomed by RMB   Accompanied by SELINA;F         5/16/2023     2:16 PM   Patient Reported Additional Medications   Patient reports taking the following new medications NONE     Healthy Habits:     Getting at least 3 servings of Calcium per day:  NO    Bi-annual eye exam:  Yes    Dental care twice a year:  Yes    Sleep apnea or symptoms of sleep apnea:  Daytime drowsiness and Excessive snoring    Diet:  Regular (no restrictions)    Frequency of exercise:  None    Taking medications regularly:  No    Barriers to taking medications:  Problems remembering to take them    PHQ-2 Total Score: 1    Additional concerns today:  Yes      Review of Systems   Constitutional: Negative for chills and fever.   HENT: Negative for congestion, ear pain, hearing loss and sore throat.    Eyes: Negative for pain and visual disturbance.   Respiratory: Negative for cough and shortness of breath.    Cardiovascular: Negative for chest pain and peripheral edema.   Gastrointestinal: Negative for abdominal pain, constipation, diarrhea, heartburn, hematochezia and nausea.   Breasts:  Negative for tenderness, breast mass and discharge.   Genitourinary: Negative for dysuria, frequency, genital sores, hematuria, pelvic pain, urgency, vaginal bleeding and vaginal discharge.   Musculoskeletal: Positive for arthralgias and joint swelling. Negative for myalgias.   Skin: Negative for rash.   Neurological: Negative for dizziness, weakness, headaches and paresthesias.   Psychiatric/Behavioral: Negative for mood changes. The patient is not nervous/anxious.       Has joint swelling and pain which has happened 3 times and occurs rarely.  Hips, knees and ankles and will end up with  "swelling in the ankles.    Patient also is concerned that she has ADHD since she works at [email or web address] as a manager and has a hard time keeping concentration and feeling like she gets lost in what she is doing all the time.  Does not feel that she completes jobs but moves on to others so always has uncompleted work which is affecting her job performance.      Objective    /68   Pulse 90   Temp 98  F (36.7  C) (Tympanic)   Resp 14   Ht 1.552 m (5' 1.1\")   Wt 80.7 kg (178 lb)   SpO2 98%   BMI 33.52 kg/m    Body mass index is 33.52 kg/m .  Physical Exam   GENERAL: healthy, alert and no distress  EYES: Eyes grossly normal to inspection, PERRL and conjunctivae and sclerae normal  HENT: ear canals and TM's normal, nose and mouth without ulcers or lesions  NECK: no adenopathy, no asymmetry, masses, or scars and thyroid normal to palpation  RESP: lungs clear to auscultation - no rales, rhonchi or wheezes  CV: regular rate and rhythm, normal S1 S2, no S3 or S4, no murmur, click or rub, no peripheral edema and peripheral pulses strong  ABDOMEN: soft, nontender, no hepatosplenomegaly, no masses and bowel sounds normal  MS: no gross musculoskeletal defects noted, no edema  SKIN: no suspicious lesions or rashes  NEURO: Normal strength and tone, mentation intact and speech normal  BACK: no CVA tenderness, no paralumbar tenderness  PSYCH: mentation appears normal, affect normal/bright  LYMPH: normal ant/post cervical, supraclavicular nodes      "

## 2023-05-17 LAB
ANA SER QL IF: NEGATIVE
RHEUMATOID FACT SER NEPH-ACNC: <7 IU/ML

## 2023-05-25 ASSESSMENT — PATIENT HEALTH QUESTIONNAIRE - PHQ9
SUM OF ALL RESPONSES TO PHQ QUESTIONS 1-9: 10
10. IF YOU CHECKED OFF ANY PROBLEMS, HOW DIFFICULT HAVE THESE PROBLEMS MADE IT FOR YOU TO DO YOUR WORK, TAKE CARE OF THINGS AT HOME, OR GET ALONG WITH OTHER PEOPLE: VERY DIFFICULT
SUM OF ALL RESPONSES TO PHQ QUESTIONS 1-9: 10

## 2023-05-26 ENCOUNTER — VIRTUAL VISIT (OUTPATIENT)
Dept: PSYCHOLOGY | Facility: CLINIC | Age: 33
End: 2023-05-26
Attending: NURSE PRACTITIONER
Payer: COMMERCIAL

## 2023-05-26 DIAGNOSIS — F33.0 MAJOR DEPRESSIVE DISORDER, RECURRENT EPISODE, MILD WITH ANXIOUS DISTRESS (H): Primary | ICD-10-CM

## 2023-05-26 PROCEDURE — 90791 PSYCH DIAGNOSTIC EVALUATION: CPT | Mod: VID | Performed by: PSYCHOLOGIST

## 2023-05-26 ASSESSMENT — COLUMBIA-SUICIDE SEVERITY RATING SCALE - C-SSRS
4. HAVE YOU HAD THESE THOUGHTS AND HAD SOME INTENTION OF ACTING ON THEM?: NO
LETHALITY/MEDICAL DAMAGE CODE MOST RECENT POTENTIAL ATTEMPT: BEHAVIOR LIKELY TO RESULT IN INJURY BUT NOT LIKELY TO CAUSE DEATH
LETHALITY/MEDICAL DAMAGE CODE FIRST ACTUAL ATTEMPT: NO PHYSICAL DAMAGE OR VERY MINOR PHYSICAL DAMAGE
LETHALITY/MEDICAL DAMAGE CODE FIRST POTENTIAL ATTEMPT: BEHAVIOR LIKELY TO RESULT IN INJURY BUT NOT LIKELY TO CAUSE DEATH
2. HAVE YOU ACTUALLY HAD ANY THOUGHTS OF KILLING YOURSELF?: NO
LETHALITY/MEDICAL DAMAGE CODE MOST LETHAL POTENTIAL ATTEMPT: BEHAVIOR LIKELY TO RESULT IN INJURY BUT NOT LIKELY TO CAUSE DEATH
5. HAVE YOU STARTED TO WORK OUT OR WORKED OUT THE DETAILS OF HOW TO KILL YOURSELF? DO YOU INTEND TO CARRY OUT THIS PLAN?: YES
1. HAVE YOU WISHED YOU WERE DEAD OR WISHED YOU COULD GO TO SLEEP AND NOT WAKE UP?: YES
2. HAVE YOU ACTUALLY HAD ANY THOUGHTS OF KILLING YOURSELF?: YES
6. HAVE YOU EVER DONE ANYTHING, STARTED TO DO ANYTHING, OR PREPARED TO DO ANYTHING TO END YOUR LIFE?: NO
1. IN THE PAST MONTH, HAVE YOU WISHED YOU WERE DEAD OR WISHED YOU COULD GO TO SLEEP AND NOT WAKE UP?: NO
LETHALITY/MEDICAL DAMAGE CODE MOST RECENT ACTUAL ATTEMPT: NO PHYSICAL DAMAGE OR VERY MINOR PHYSICAL DAMAGE
5. HAVE YOU STARTED TO WORK OUT OR WORKED OUT THE DETAILS OF HOW TO KILL YOURSELF? DO YOU INTEND TO CARRY OUT THIS PLAN?: NO
ATTEMPT LIFETIME: YES
ATTEMPT PAST THREE MONTHS: NO
TOTAL  NUMBER OF ABORTED OR SELF INTERRUPTED ATTEMPTS LIFETIME: NO
4. HAVE YOU HAD THESE THOUGHTS AND HAD SOME INTENTION OF ACTING ON THEM?: YES
TOTAL  NUMBER OF INTERRUPTED ATTEMPTS LIFETIME: NO
LETHALITY/MEDICAL DAMAGE CODE MOST LETHAL ACTUAL ATTEMPT: NO PHYSICAL DAMAGE OR VERY MINOR PHYSICAL DAMAGE
3. HAVE YOU BEEN THINKING ABOUT HOW YOU MIGHT KILL YOURSELF?: YES
REASONS FOR IDEATION LIFETIME: MOSTLY TO END OR STOP THE PAIN (YOU COULDN'T GO ON LIVING WITH THE PAIN OR HOW YOU WERE FEELING)

## 2023-05-26 ASSESSMENT — ANXIETY QUESTIONNAIRES
IF YOU CHECKED OFF ANY PROBLEMS ON THIS QUESTIONNAIRE, HOW DIFFICULT HAVE THESE PROBLEMS MADE IT FOR YOU TO DO YOUR WORK, TAKE CARE OF THINGS AT HOME, OR GET ALONG WITH OTHER PEOPLE: SOMEWHAT DIFFICULT
GAD7 TOTAL SCORE: 9
1. FEELING NERVOUS, ANXIOUS, OR ON EDGE: SEVERAL DAYS
GAD7 TOTAL SCORE: 9
5. BEING SO RESTLESS THAT IT IS HARD TO SIT STILL: NEARLY EVERY DAY
7. FEELING AFRAID AS IF SOMETHING AWFUL MIGHT HAPPEN: NOT AT ALL
3. WORRYING TOO MUCH ABOUT DIFFERENT THINGS: MORE THAN HALF THE DAYS
2. NOT BEING ABLE TO STOP OR CONTROL WORRYING: SEVERAL DAYS
6. BECOMING EASILY ANNOYED OR IRRITABLE: SEVERAL DAYS

## 2023-05-26 ASSESSMENT — PATIENT HEALTH QUESTIONNAIRE - PHQ9: 5. POOR APPETITE OR OVEREATING: SEVERAL DAYS
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
## 2023-06-09 ENCOUNTER — TELEPHONE (OUTPATIENT)
Dept: FAMILY MEDICINE | Facility: CLINIC | Age: 33
End: 2023-06-09
Payer: COMMERCIAL

## 2023-06-09 NOTE — LETTER
June 9, 2023    To  Pao Wood  [address] [facility] [place] [address]    Your team at [facility] cares about your health. We have reviewed your chart and based on our findings; we are making the following recommendations to better manage your health.     You are in particular need of attention regarding the following:     Schedule a primary care office visit with your provider for a Pap Smear to screen for Cervical Cancer.    If you have already completed these items, please contact the clinic via phone or   [facility] so your care team can review and update your records. Thank you for   choosing [facility] for your healthcare needs. For any questions,   concerns, or to schedule an appointment please contact our clinic.    Healthy Regards,      Your [facility] Care Team

## 2023-06-09 NOTE — TELEPHONE ENCOUNTER
Patient Quality Outreach    Patient is due for the following:   Cervical Cancer Screening - PAP Needed    Next Steps:   pt to schedule    Type of outreach:    Sent letter.      Questions for provider review:    None           Brennon Flores

## 2023-06-16 ENCOUNTER — VIRTUAL VISIT (OUTPATIENT)
Dept: NUTRITION | Facility: CLINIC | Age: 33
End: 2023-06-16
Payer: COMMERCIAL

## 2023-06-16 DIAGNOSIS — R73.03 PREDIABETES: Primary | ICD-10-CM

## 2023-06-16 PROCEDURE — 97802 MEDICAL NUTRITION INDIV IN: CPT | Mod: 93 | Performed by: DIETITIAN, REGISTERED

## 2023-06-16 NOTE — PROGRESS NOTES
Type of Service: Telephone Visit    Originating Location (Patient Location): Home  Distant Location (Provider Location): Offsite  Mode of Communication:  Telephone    Telephone Visit Start Time: 12:00pm  Telephone Visit End Time (telephone visit stop time): 12:48pm (had a period from 12:41-12:43 pm where internet cut out so had to call patient back)    How would patient like to obtain AVS? [facility]    Medical Nutrition Therapy  Visit Type:Initial assessment and intervention    Pao Wood presents today for MNT and education related to prediabetes.   She is accompanied by self.     ASSESSMENT:   Patient comments/concerns relating to nutrition: she is not a sweets person. Not sure how she has such high blood glucose. Says her maternal grandma has diabetes.     Says she gained weight due to eating more fast food. Says cut back on it. Says she cut out Body [place].  Limits herself to 1 cup coffee a day.     Says she is checking for ADHD. Says she if very picky on what she eats.     NUTRITION HISTORY:    Breakfast: None or 1 cup coffee OR Oats overnight -mixes with whole milk   AM: none  Lunch: None OR tuna and crackers OR hard boiled egg  PM: none  Dinner: 6:30pm: 2 cheeseburger grilled on bun with pickles, corn on the cob, and watermelon OR 2 bean and cheese frozen burritos with extra cheese  Snacks: None  Beverages: Coffee 1 cup/day and Water all day    Misses meals? Yes- breakfast and lunch- usually only eats 1 meal a day.  Eats out:  0-1 meals/per week (cut back from daily eating out to 1 time).    Previous diet education:  No     Food allergies/intolerances: spinach  Dislikes: picky on food-dislikes fresh water fish, [place] sprouts, asparagus    Diet is high in: carbs  Diet is low in: fiber, fruits and vegetables    EXERCISE: Works 12-14 hour days so not do much outside of work.     SOCIO/ECONOMIC:   Lives with: spouse and children    MEDICATIONS:  Current Outpatient Medications   Medication     ibuprofen  "(ADVIL/MOTRIN) 200 MG tablet     No current facility-administered medications for this visit.       LABS:  Lab Results   Component Value Date     05/22/2017      Lab Results   Component Value Date    POTASSIUM 3.9 05/22/2017     Lab Results   Component Value Date    CHLORIDE 107 05/22/2017     Lab Results   Component Value Date    CIRILO 8.8 05/22/2017     Lab Results   Component Value Date    CO2 27 05/22/2017     Lab Results   Component Value Date    BUN 11 05/22/2017     Lab Results   Component Value Date    CR 0.63 05/22/2017     Lab Results   Component Value Date    GLC 90 05/22/2017     Lab Results   Component Value Date     05/16/2023     Direct Measure HDL   Date Value Ref Range Status   05/16/2023 43 (L) >=50 mg/dL Final   ]  GFR Estimate   Date Value Ref Range Status   05/22/2017 >90  Non  GFR Calc   >60 mL/min/1.7m2 Final     Lab Results   Component Value Date    CR 0.63 05/22/2017     No results found for: MICROALBUMIN    ANTHROPOMETRICS:  Vitals: [facility] 05/01/2017   Estimated body mass index is 33.52 kg/m  as calculated from the following:    Height as of 5/16/23: 1.552 m (5' 1.1\").    Weight as of 5/16/23: 80.7 kg (178 lb).        Wt Readings from Last 5 Encounters:   05/16/23 80.7 kg (178 lb)   11/16/20 61.2 kg (135 lb)   07/12/19 59 kg (130 lb)   06/21/19 59 kg (130 lb)   05/22/17 56.7 kg (125 lb)       Weight Change: unable to assess- not new weight today. Per clinic weight, gained 43 lbs in the past 2.5 years.    ESTIMATED KCAL REQUIREMENTS:  1750 kcal per day (based on last clinic weight from 5/16/23)    NUTRITION DIAGNOSIS: Food- and nutrition-related knowledge deficit related to healthy eating for prediabetes as evidenced by patient stating not much family history and uncertain about changes to make to help improve both blood glucose and cholesterol.    NUTRITION INTERVENTION:  Nutrition Prescription: Eat 3 meals a day  Plate method at meals  Education given to support: " general nutrition guidelines, weight reduction, consistent meals, free foods, artificial sweeteners, exercise, fiber, behavior modification, portion control and heart healthy diet  Education Materials Provided: My Plate Planner/Choose My Plate, 1500 Calorie 5-day Sample Menus, Preventing Diabetes and Heart-Healthy Consistent Carbohydrate Nutrition Therapy  Motivational Interviewing    Discussion: Started by reviewing prediabetes diagnosis, risk factors, and changes to try to improve blood glucose values. Pao has already made some great changes to cut out simple sugars (more water instead of Body Pinon) and cut way back on fast food consumption. She had noticed some weight gain from eating out more. 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  Due to being so busy, often forgets to eat so has 1 large dinner daily.     Discussed trying to spread her intake throughout the day and lighten meal/carb intake at night if possible and Pao says she is trying to do an overnight oat drink before work so she gets something to eat and encouraged her to keep this going. She feels she may be able to bring some fruits and vegetables to work to eat midday. For dinner, suggested smaller plate/smaller portions or trying to add more veggies for filler. Reviewed carbohydrate sources and suggested plate method for simplicity on balancing meals. Pt verbalized understanding of concepts discussed and recommendations provided.       PATIENT'S BEHAVIOR CHANGE GOALS:   See Patient Instructions for patient stated behavior change goals. AVS sent by [employer].    MONITOR / EVALUATE:  RD will monitor/evaluate:  Food / Beverage / Nutrient intake   Pertinent Labs  Progress toward meeting stated nutrition-related goals  Weight change    FOLLOW-UP:  Call RD with questions/concerns.   Follow-up appointment scheduled on 7/14/23.     Kandy An RDN, KALE, WILFREDES   Time spent in minutes: 46  minutes  Encounter: Individual telephone visit

## 2023-06-16 NOTE — PATIENT INSTRUCTIONS
Continue to have the overnight oats in the morning.    2. Bring some cut up fruits and vegetables for lunch.    3. Try to limit dinner to 1 plate or add a veggie to make it easier to reduce carbohydrate portions.    Other tips:    Plate Method at meals:  1/2 plate veggies OR 1/4 plate veggies and 1/4 plate fruit (raw, frozen and canned if rinsed or are no-salt added/low-sugar are all fine)  1/4 plate lean meat (3-4 oz)  1/4 plate grains, ideally whole grains when able (1/2-1 cup rice/pasta/other grains/potatoes OR 1-2 slices bread)    Helpful Website: www.myplate.gov    -Ok to have a couple of Crystal Lite packet a day. If you want to lower more, could try to infuse water with     FOLLOW UP APPOINTMENT: Will call you again on Friday, July 14th around 10am.     Kandy An RDN, LD, [facility]   [phone]

## 2023-07-14 ENCOUNTER — VIRTUAL VISIT (OUTPATIENT)
Dept: NUTRITION | Facility: CLINIC | Age: 33
End: 2023-07-14
Payer: COMMERCIAL

## 2023-07-14 DIAGNOSIS — R73.03 PREDIABETES: Primary | ICD-10-CM

## 2023-07-14 PROCEDURE — 97803 MED NUTRITION INDIV SUBSEQ: CPT | Mod: 93 | Performed by: DIETITIAN, REGISTERED

## 2023-07-14 NOTE — PROGRESS NOTES
"Type of Service: Telephone Visit    Originating Location (Patient Location): Home  Distant Location (Provider Location): Offsite  Mode of Communication:  Telephone    Telephone Visit Start Time: 10:01am  Telephone Visit End Time (telephone visit stop time): 10:33am    [facility]       Medical Nutrition Therapy  Visit Type:Reassessment and intervention    Pao Wood presents today for MNT and education related to prediabetes and weight management.   She is accompanied by self.     ASSESSMENT:   Patient comments/concerns relating to nutrition: Feels things are going pretty well.  Says she cut way back on fast food to maybe 1 time a week, if even this.  Says her water intake is up and down. Says gets very bus and forgets to drink water. Has water at meals but not while working.     Says has a water bottle at work that keeps her water very cold. Says has a hard time remembering to drink water.     Says overnight oats was causing GERD from the milk. Says weight is fluctuating between 170-178 mg/dL.     Says eating more fruit. Says she is so tired after work, hard to prepare the dinner meals.     NUTRITION HISTORY:    Breakfast: \"still a challenge- not a breakfast person\". 1 cup coffee and water OR overnight oats on occasion (milk was giving her heartburn)  Lunch: Sub or wrap - stays away from pizza/food in warmer (Subway)  Dinner: Katy Calendar frozen meal OR fettuccini jack pasta (Frozen meal- smaller) with pre-cut ham, bag steamed broccoli, and cheese. Says trying to cut back on carbohydrates.  Snacks: fruit (instead of chips)  Beverages: Coffee 1 cup/day and Water all day    Misses meals? Breakfast   Eats out:  1 meals/per week     Previous diet education:  Yes     Food allergies/intolerances: spinach  Dislikes: picky on food-dislikes fresh water fish, [place] sprouts, asparagus    Diet is high in: fruits  Diet is low in: fluids- trying to drink more water    EXERCISE: " "Works 12-14 hours days so not doing much outside of work.     SOCIO/ECONOMIC:   Lives with: spouse and children    MEDICATIONS:  Current Outpatient Medications   Medication     ibuprofen (ADVIL/MOTRIN) 200 MG tablet     No current facility-administered medications for this visit.       LABS:  Lab Results   Component Value Date     05/22/2017      Lab Results   Component Value Date    POTASSIUM 3.9 05/22/2017     Lab Results   Component Value Date    CHLORIDE 107 05/22/2017     Lab Results   Component Value Date    CIRILO 8.8 05/22/2017     Lab Results   Component Value Date    CO2 27 05/22/2017     Lab Results   Component Value Date    BUN 11 05/22/2017     Lab Results   Component Value Date    CR 0.63 05/22/2017     Lab Results   Component Value Date    GLC 90 05/22/2017     Lab Results   Component Value Date     05/16/2023     Direct Measure HDL   Date Value Ref Range Status   05/16/2023 43 (L) >=50 mg/dL Final   ]  GFR Estimate   Date Value Ref Range Status   05/22/2017 >90  Non  GFR Calc   >60 mL/min/1.7m2 Final     Lab Results   Component Value Date    CR 0.63 05/22/2017     No results found for: MICROALBUMIN    ANTHROPOMETRICS:  Vitals: [facility] 05/01/2017   Estimated body mass index is 33.52 kg/m  as calculated from the following:    Height as of 5/16/23: 1.552 m (5' 1.1\").    Weight as of 5/16/23: 80.7 kg (178 lb).       Wt Readings from Last 5 Encounters:   05/16/23 80.7 kg (178 lb)   11/16/20 61.2 kg (135 lb)   07/12/19 59 kg (130 lb)   06/21/19 59 kg (130 lb)   05/22/17 56.7 kg (125 lb)       Weight Change: unable to assess- no new clinic weight. Per patient, fluctuating from 170-178 lbs.    ESTIMATED KCAL REQUIREMENTS:  0899-3598 kcal per day (based on last clinic weight from 5/16/23)    NUTRITION DIAGNOSIS: Impaired nutrient utilization related to prediabetes as evidenced by A1C of 5.7%.    NUTRITION INTERVENTION:  Nutrition Prescription: Energy Intake: 1500 kcal/day  for weight " loss  Plate Method at Meals  Education given to support: general nutrition guidelines, weight reduction, consistent meals, carb counting, labeling, fiber, behavior modification, portion control and heart healthy diet  Motivational Interviewing      Discussion: Pao feels things are going well. Has been able to sustain limiting fast food to 0-1x/week. Has been struggling to eat breakfast, especially since she thinks the milk in her overnight oats may be contributing. Suggested she could try eating smaller amount or if not hungry for this, could drink a latte if she likes milk in her coffee. Commended her on adding more veggies with both lunch (sub/wraps) and dinner. She also feels she is watching labels more and limiting carbohydrates. Commended her on these changes and to keep them going.    When asked about changes in weight, says it fluctuates between 170-178 lbs. She struggles with water intake even though she has a water bottle nearby- just gets busy at work and forgets. Suggested, since already drinking water with meals, could double the water here to help with hydration. She also feels it is hard to prepare meals after a long days so discussed make ahead, crock pot, and quick meal ideas. Pt verbalized understanding of concepts discussed and recommendations provided.        PATIENT'S BEHAVIOR CHANGE GOALS:   See Patient Instructions for patient stated behavior change goals. AVS was sent by [employer].    MONITOR / EVALUATE:  RD will monitor/evaluate:  Food / Beverage / Nutrient intake   Pertinent Labs  Progress toward meeting stated nutrition-related goals  Weight change    FOLLOW-UP:  Follow up with RD as needed.  Call RD with questions/concerns. Patient declines additional follow up at this time and feels she has the information she needs. Feels comfortable to reach out if follow up desired in the future.    Kandy An,  CHEN, LD, CDCES   Time spent in minutes: 32 minutes  Encounter: Individual telephone  visit

## 2023-07-14 NOTE — PATIENT INSTRUCTIONS
Could try a half batch of overnight oats (or make with a milk alternative such as almond milk) OR could make a latte to mix milk and coffee for energy (as long as milk is not causing reflux).    2. IIf you want to get more water, and having trouble remembering to drink during the day, could have a larger glass at meals    3. Consider some Crock-Pot meals for weekday preparation.    4. Doing a great job on label reading, adding more veggies with dinner and reducing carbohydrate intake and reducing fast food- keep up the good work!    Here are some helpful websites for meal planning and recipe ideas:  -MyPlate: www.myplate.gov  -American Diabetes Association's Diabetes Food Hub: www.diabetesfoodhub.org   -American Heart Association: https://www.heart.org/en/healthy-living/healthy-eating  -Produce for Better Health (great site for vegetable recipes): https://[employer] ([place]).org/recipes    FOLLOW UP: Please reach out if you have any questions or if another follow up would be helpful for you.     Kandy An RDN, LD, [facility]  [phone]

## 2023-10-09 NOTE — COMMUNITY RESOURCES LIST (ENGLISH)
10/09/2023   [facility]  N/A  For questions about this resource list or additional care needs, please contact your primary care clinic or care manager.  Phone: [phone]   Email: N/A   Address: [address] [place] [address]   Hours: N/A        Food and Nutrition       Food pantry  1  Community Helping Hand - Food Shelf Distance: 4.49 miles      In-Person   [address] [place] [address]  Language: English  Hours: Mon - Sun Appt. Only  Fees: Free   Phone: (548) 172-1356 Email: [email or web address]@[email or web address].Itegria Website: http://www.[facility].org     2  Family Pathways Food Shelf [facility] Distance: 5.11 miles      Delivery, [facility]   [address] [place] [address]  Language: English  Hours: Mon 9:00 AM - 5:00 PM , Tue 11:00 AM - 5:00 PM , Thu 9:00 AM - 5:00 PM  Fees: Free   Phone: (658) 364-4096 Email: mail@Kmsocial.ClearGist Website: https://www.Kmsocial.ClearGist/our-work/food-access-and-equity/     SNAP application assistance  3  [facility] [place] - [place] Family Investment Program (MFIP) - [place] Family Investment Program (MFIP) Distance: 8.73 miles      In-Person, Phone/Virtual   [address] [address]  Language: English  Hours: Mon - Fri 8:00 AM - 4:30 PM  Fees: Free   Phone: (796) 933-2927 Email: imgeneralquestions@[facility].gov Website: https://www.[facility]./499/MFIP-Biennial-Service-Agreement-VCA-Plan     4  [facility] Action [place] ([facility]) - [place] Office Distance: 12.75 miles      In-Person, Phone/Virtual   [address] [place] [address]  Language: English  Hours: Mon - Fri 8:00 AM - 4:30 PM  Fees: Free   Phone: (135) 323-4143 Email: clifford@[facility] Website: https://www.lakesandpines.org/agency-information     Soup kitchen or free meals  5  [facility] - [facility] - Thursday Night Community Meal Distance: 19.36  miles      In-Person   [address] [place] [address]  Language: English, Kinyarwanda  Hours: Thu 6:00 PM - 7:00 PM  Fees: Free   Phone: (836) 796-5218 Email: [place]@saintandrews.org Website: https://www.saintandrews.org     6  [facility] - Family Table Meals - Family Table Meal Distance: 19.67 miles      Pickup   [address] [place] [address]  Language: English  Hours: Thu 5:00 PM - 6:30 PM  Fees: Free   Phone: (942) 387-4986 Email: minh@[employer]Masala Website: http://www.[employer] ([employer]E).Masala          Important Numbers & Websites       Emergency Services   911  City Services   311  Poison Control   (621) 368-7592  Suicide Prevention Lifeline   (107) 374-6987 (TALK)  Child Abuse Hotline   (584) 763-9581 (4-A-Child)  Sexual Assault Hotline   (373) 447-9003 (HOPE)  National Runaway Safeline   (193) 597-1481 (RUNAWAY)  All-Options Talkline   (684) 296-9025  Substance Abuse Referral   (587) 660-6746 (HELP)

## 2023-10-12 ENCOUNTER — TELEPHONE (OUTPATIENT)
Dept: FAMILY MEDICINE | Facility: CLINIC | Age: 33
End: 2023-10-12
Payer: COMMERCIAL

## 2023-10-12 NOTE — LETTER
October 12, 2023    To  Pao Wood  [address] [facility] [place] [address]    Your team at [facility] cares about your health. We have reviewed your chart and based on our findings; we are making the following recommendations to better manage your health.     You are in particular need of attention regarding the following:     Schedule a primary care office visit with your provider for a Pap Smear to screen for Cervical Cancer.    If you have already completed these items, please contact the clinic via phone or   Qubolehart so your care team can review and update your records. Thank you for   choosing [facility] for your healthcare needs. For any questions,   concerns, or to schedule an appointment please contact our clinic.    Healthy Regards,      Your [facility] Care Team

## 2023-10-16 ENCOUNTER — HOSPITAL ENCOUNTER (OUTPATIENT)
Dept: GENERAL RADIOLOGY | Facility: CLINIC | Age: 33
Discharge: HOME | End: 2023-10-16
Attending: NURSE PRACTITIONER | Admitting: NURSE PRACTITIONER
Payer: COMMERCIAL

## 2023-10-16 ENCOUNTER — OFFICE VISIT (OUTPATIENT)
Dept: FAMILY MEDICINE | Facility: CLINIC | Age: 33
End: 2023-10-16
Payer: COMMERCIAL

## 2023-10-16 VITALS
OXYGEN SATURATION: 99 % | BODY MASS INDEX: 23.72 KG/M2 | SYSTOLIC BLOOD PRESSURE: 120 MMHG | HEIGHT: 71 IN | RESPIRATION RATE: 20 BRPM | WEIGHT: 169.4 LBS | TEMPERATURE: 97.6 F | HEART RATE: 78 BPM | DIASTOLIC BLOOD PRESSURE: 68 MMHG

## 2023-10-16 DIAGNOSIS — R22.31 NODULE OF FINGER OF RIGHT HAND: Primary | ICD-10-CM

## 2023-10-16 DIAGNOSIS — R22.31 NODULE OF FINGER OF RIGHT HAND: ICD-10-CM

## 2023-10-16 DIAGNOSIS — R92.30 DENSE BREAST TISSUE: ICD-10-CM

## 2023-10-16 PROCEDURE — 73140 X-RAY EXAM OF FINGER(S): CPT | Mod: RT

## 2023-10-16 PROCEDURE — 99213 OFFICE O/P EST LOW 20 MIN: CPT | Performed by: NURSE PRACTITIONER

## 2023-10-16 ASSESSMENT — ENCOUNTER SYMPTOMS
MYALGIAS: 0
PARESTHESIAS: 0
FEVER: 0
HEARTBURN: 0
HEMATURIA: 0
DIZZINESS: 0
FREQUENCY: 0
SHORTNESS OF BREATH: 0
HEADACHES: 1
CONSTIPATION: 0
JOINT SWELLING: 1
WEAKNESS: 0
EYE PAIN: 0
NERVOUS/ANXIOUS: 0
BREAST MASS: 1
ABDOMINAL PAIN: 0
NAUSEA: 0
COUGH: 1
SORE THROAT: 0
PALPITATIONS: 0
ARTHRALGIAS: 1
HEMATOCHEZIA: 0
CHILLS: 0
DYSURIA: 0
DIARRHEA: 0

## 2023-10-16 ASSESSMENT — ANXIETY QUESTIONNAIRES
2. NOT BEING ABLE TO STOP OR CONTROL WORRYING: SEVERAL DAYS
GAD7 TOTAL SCORE: 8
4. TROUBLE RELAXING: SEVERAL DAYS
GAD7 TOTAL SCORE: 8
1. FEELING NERVOUS, ANXIOUS, OR ON EDGE: SEVERAL DAYS
IF YOU CHECKED OFF ANY PROBLEMS ON THIS QUESTIONNAIRE, HOW DIFFICULT HAVE THESE PROBLEMS MADE IT FOR YOU TO DO YOUR WORK, TAKE CARE OF THINGS AT HOME, OR GET ALONG WITH OTHER PEOPLE: SOMEWHAT DIFFICULT
6. BECOMING EASILY ANNOYED OR IRRITABLE: MORE THAN HALF THE DAYS
7. FEELING AFRAID AS IF SOMETHING AWFUL MIGHT HAPPEN: SEVERAL DAYS
3. WORRYING TOO MUCH ABOUT DIFFERENT THINGS: SEVERAL DAYS
5. BEING SO RESTLESS THAT IT IS HARD TO SIT STILL: SEVERAL DAYS

## 2023-10-16 ASSESSMENT — PAIN SCALES - GENERAL: PAINLEVEL: SEVERE PAIN (6)

## 2023-10-16 ASSESSMENT — PATIENT HEALTH QUESTIONNAIRE - PHQ9
SUM OF ALL RESPONSES TO PHQ QUESTIONS 1-9: 12
SUM OF ALL RESPONSES TO PHQ QUESTIONS 1-9: 12
10. IF YOU CHECKED OFF ANY PROBLEMS, HOW DIFFICULT HAVE THESE PROBLEMS MADE IT FOR YOU TO DO YOUR WORK, TAKE CARE OF THINGS AT HOME, OR GET ALONG WITH OTHER PEOPLE: VERY DIFFICULT

## 2023-10-16 NOTE — PATIENT INSTRUCTIONS
Make appointment with orthopedics for evaluation of nodule on right middle finger.  Keep an eye out for any changes in breast lump (pain, redness, tenderness, drainage from the nipple) and if this happens follow-up in clinic.  
2 = assistive person

## 2023-10-16 NOTE — COMMUNITY RESOURCES LIST (ENGLISH)
10/16/2023   [facility] [employer]  N/A  For questions about this resource list or additional care needs, please contact your primary care clinic or care manager.  Phone: [phone]   Email: N/A   Address: [address] [place] [address]   Hours: N/A        Food and Nutrition       Food pantry  1  Community Helping Hand - Emergency Food Shelf Distance: 4.49 miles      In-Person   [address] [place] [address]  Language: English  Hours: Mon - Sun Appt. Only  Fees: Free   Phone: (408) 539-4755 Email: corwinmayapastor@[employer].AdultSpace Website: http://www.communityJackson General Hospitalhand.org     2  Family Pathways Food Shelf [facility] Distance: 5.11 miles      Delivery, Pick   [address] [place] [address]  Language: English  Hours: Mon 9:00 AM - 5:00 PM , Tue 11:00 AM - 5:00 PM , Thu 9:00 AM - 5:00 PM  Fees: Free   Phone: (313) 396-8974 Email: mail@[employer]ink display technologies.Squee Website: https://www.[employer].Squee/our-work/food-access-and-equity/     SNAP application assistance  3  [facility] [place] - [place] Family Investment Program (MFIP) - [place] Family Investment Program (MFIP) - SNAP application assistance Distance: 8.73 miles      In-Person, Phone/Virtual   [address] [address]  Language: English  Hours: Mon - Fri 8:00 AM - 4:30 PM  Fees: Free   Phone: (256) 901-1420 Email: imgeneralquestions@[facility].gov Website: https://www.[facility]./499/MFIP-Biennial-Service-Agreement-VCA-Plan     4  [facility] Community Action [place] ([facility]) - [place] Office Distance: 12.75 miles      In-Person, Phone/Virtual   [address] [place] [address]  Language: English  Hours: Mon - Fri 8:00 AM - 4:30 PM  Fees: Free   Phone: (370) 585-2874 Email: clifford@[employer] Website: https://www.[facility].org/agency-information     Soup kitchen or free meals  5  [facility] -  Thursday Night Community Meal Distance: 19.36 miles      In-Person   [address] [place] [address]  Language: English, Macanese  Hours: Thu 6:00 PM - 7:00 PM  Fees: Free   Phone: (914) 514-8749 Email: [place]@saintandrews.org Website: https://www.saintandrews.org     6  [facility] - Family Table Meals - Family Table Meal Distance: 19.67 miles      Pick   [address] [place] [address]  Language: English  Hours: Thu 5:00 PM - 6:30 PM  Fees: Free   Phone: (737) 944-6051 Email: minh@UserApp Website: http://www.Blockboard.[employer]          Important Numbers & Websites       Emergency Services   911  City Services   311  Poison Control   (416) 923-6013  Suicide Prevention Lifeline   (850) 225-6781 (TALK)  Child Abuse Hotline   (981) 137-7828 (4-A-Child)  Sexual Assault Hotline   (962) 849-7057 (HOPE)  National Runaway Safeline   (573) 579-1147 (RUNAWAY)  All-Options Talkline   (739) 360-4847  Substance Abuse Referral   (280) 959-1577 (HELP)

## 2023-10-16 NOTE — COMMUNITY RESOURCES LIST (ENGLISH)
10/16/2023   [facility]  N/A  For additional resource needs, please contact your health insurance member services or your primary care team.  Phone: [phone]   Email: N/A   Address: [address] [place] [address]   Hours: N/A        Food and Nutrition       Food pantry  1  Community Helping Hand - Emergency Food Shelf Distance: 4.49 miles      In-Person   [address] [place] [address]  Language: English  Hours: Mon - Sun Appt. Only  Fees: Free   Phone: (187) 946-7892 Email: edward@SovTech.Aprilage Website: http://www.[facility].org     2  Family Pathways Food Shelf - [facility] Distance: 5.11 miles      Delivery, [facility]   [address] [place] [address]  Language: English  Hours: Mon 9:00 AM - 5:00 PM , Tue 11:00 AM - 5:00 PM , Thu 9:00 AM - 5:00 PM  Fees: Free   Phone: (794) 170-9539 Email: mail@RippleFunction.Microvisk Technologies Website: https://www.RippleFunction.Microvisk Technologies/our-work/food-access-and-equity/     SNAP application assistance  3  [facility] - [place] Family Investment Program (MFIP) - [place] Family Investment Program (MFIP) - SNAP application assistance Distance: 8.73 miles      In-Person, Phone/Virtual   [address] [address]  Language: English  Hours: Mon - Fri 8:00 AM - 4:30 PM  Fees: Free   Phone: (692) 490-5687 Email: imgeneralquestions@[facility].gov Website: https://www.[facility]./499/MFIP-Biennial-Service-Agreement-VCA-Plan     4  [facility] Community Action [place] ([facility]) - [place] Office Distance: 12.75 miles      In-Person, Phone/Virtual   [address] [place] [address]  Language: English  Hours: Mon - Fri 8:00 AM - 4:30 PM  Fees: Free   Phone: (100) 966-5023 Email: clifford@Giggem Website: https://www.[facility].org/agency-information     Soup kitchen or free meals  5  [facility] - Thursday  Night Community Meal Distance: 19.36 miles      In-Person   [address] [place] [address]  Language: English, Nepalese  Hours: Thu 6:00 PM - 7:00 PM  Fees: Free   Phone: (842) 141-2266 Email: Maxatawny@saintandrews.org Website: https://www.saintandrews.org     6  [facility] - Family Table Meals - Family Table Meal Distance: 19.67 miles      Pickup   [address] [place] [address]  Language: English  Hours: Thu 5:00 PM - 6:30 PM  Fees: Free   Phone: (780) 655-9606 Email: minh@LocoX.com.Played Website: http://www.LocoX.com.Played          Important Numbers & Websites       [place] [address] 211itedway.org  Poison Control   (603) 647-1446 [place]poison.org  Suicide and Crisis Lifeline   988 98[facility].org  Childhelp [place] Child Abuse Hotline   415.300.4908 Childhelphotline.org  National Sexual Assault Hotline   (430) 949-5567 (HOPE) [facility]  National Runaway Safeline   (847) 966-8099 (RUNAWAY) [facility] Hospitalrunaway.org  Pregnancy & Postpartum Support [place]   Call/text 743-976-3070 Ppsupportmn.org  Substance Abuse National Helpline ([facility]   703-092-HELP (1252) Findtreatment.gov  Emergency Services   911

## 2023-10-16 NOTE — COMMUNITY RESOURCES LIST (ENGLISH)
10/16/2023   [facility]  N/A  For questions about this resource list or additional care needs, please contact your primary care clinic or care manager.  Phone: [phone]   Email: N/A   Address: [address] [place] [address]   Hours: N/A        Food and Nutrition       Food pantry  1  Community Helping Hand - Emergency Food Shelf Distance: 4.49 miles      In-Person   [address] [place] [address]  Language: English  Hours: Mon - Sun Appt. Only  Fees: Free   Phone: (770) 689-4156 Email: corwinmayapastor@Imperva.Sensys Networks Website: http://www.communityChestnut Ridge Centerhand.org     2  Family Pathways Food Shelf [facility] Distance: 5.11 miles      Delivery, Pick   [address] [place] [address]  Language: English  Hours: Mon 9:00 AM - 5:00 PM , Tue 11:00 AM - 5:00 PM , Thu 9:00 AM - 5:00 PM  Fees: Free   Phone: (445) 833-3318 Email: mail@[employer] SPARK.UpCloo Website: https://www.[place] [employer].UpCloo/our-work/food-access-and-equity/     SNAP application assistance  3  [facility] - [place] Investment Program (MFIP) - [place] Family Investment Program (MFIP) - SNAP application assistance Distance: 8.73 miles      In-Person, Phone/Virtual   [address] [address]  Language: English  Hours: Mon - Fri 8:00 AM - 4:30 PM  Fees: Free   Phone: (600) 337-8777 Email: imgeneralquestions@[facility].gov Website: https://www.[facility]./499/MFIP-Biennial-Service-Agreement-VCA-Plan     4  [facility] Community Action [place] [facility]) - [place] Office Distance: 12.75 miles      In-Person, Phone/Virtual   [address] [place] [address]  Language: English  Hours: Mon - Fri 8:00 AM - 4:30 PM  Fees: Free   Phone: (525) 147-4703 Email: clifford@Neutral Space Website: https://www.[facility].org/agency-information     Soup kitchen or free meals  5  [facility] -  Thursday Night Community Meal Distance: 19.36 miles      In-Person   [address] [place] [address]  Language: English, Thai  Hours: Thu 6:00 PM - 7:00 PM  Fees: Free   Phone: (944) 177-1202 Email: Mcadoo@saintandrews.org Website: https://www.saintandrews.org     6  [facility] - Family Table Meals - Family Table Meal Distance: 19.67 miles      Pick   [address] [place] [address]  Language: English  Hours: Thu 5:00 PM - 6:30 PM  Fees: Free   Phone: (987) 338-2791 Email: minh@StarBlock.com Website: http://www.MascotaNube.Crown Bioscience          Important Numbers & Websites       Emergency Services   911  City Services   311  Poison Control   (644) 378-4452  Suicide Prevention Lifeline   (903) 630-8797 (TALK)  Child Abuse Hotline   (494) 209-8339 (4-A-Child)  Sexual Assault Hotline   (516) 775-4236 (HOPE)  National Runaway Safeline   (773) 996-1085 (RUNAWAY)  All-Options Talkline   (411) 112-6381  Substance Abuse Referral   (223) 463-9035 (HELP)

## 2023-10-16 NOTE — COMMUNITY RESOURCES LIST (ENGLISH)
10/16/2023   [facility]  N/A  For additional resource needs, please contact your health insurance member services or your primary care team.  Phone: [phone]   Email: N/A   Address: [address] [place] [address]   Hours: N/A        Food and Nutrition       Food pantry  1  Community Helping Hand - Food Shelf Distance: 4.49 miles      In-Person   [address] [place] [address]  Language: English  Hours: Mon - Sun Appt. Only  Fees: Free   Phone: (255) 272-4060 Email: edward@WedWu.ECI Telecom Website: http://www.[facility].org     2  Family Pathways Food Shelf - [facility] Distance: 5.11 miles      Delivery, Pick   [address] [place] [address]  Language: English  Hours: Mon 9:00 AM - 5:00 PM , Tue 11:00 AM - 5:00 PM , Thu 9:00 AM - 5:00 PM  Fees: Free   Phone: (875) 437-8304 Email: mail@Enter[employer]..Yillio Website: https://www.[employer]..Yillio/our-work/food-access-and-equity/     SNAP application assistance  3  [facility] - [place] Family Investment Program (MFIP) - [place] Family Investment Program (MFIP) - SNAP application assistance Distance: 8.73 miles      In-Person, Phone/Virtual   [address] [address]  Language: English  Hours: Mon - Fri 8:00 AM - 4:30 PM  Fees: Free   Phone: (593) 735-7473 Email: imgeneralquestions@[facility].gov Website: https://www.[facility]./499/MFIP-Biennial-Service-Agreement-VCA-Plan     4  [facility] [place] ([facility]) - [place] Office Distance: 12.75 miles      In-Person, Phone/Virtual   [address] [place] [address]  Language: English  Hours: Mon - Fri 8:00 AM - 4:30 PM  Fees: Free   Phone: (859) 381-4568 Email: clifford@VEASYT Website: https://www.[facility].org/agency-information     Soup kitchen or free meals  5  [facility] - Thursday Night  Community Meal Distance: 19.36 miles      In-Person   [address] [place] [address]  Language: English, Mohawk  Hours: Thu 6:00 PM - 7:00 PM  Fees: Free   Phone: (522) 631-4725 Email: [place]@saintandrews.org Website: https://www.saintandrews.org     6  [facility] - Family Table Meals - Family Table Meal Distance: 19.67 miles      Pickup   [address] [place] [address]  Language: English  Hours: Thu 5:00 PM - 6:30 PM  Fees: Free   Phone: (543) 314-8213 Email: minh@Game Ventures.MoneyLion Website: http://www.Game Ventures.MoneyLion          Important Numbers & Websites       [place] [address] 211itedway.org  Poison Control   (820) 675-9146 [place]poison.org  Suicide and Crisis Lifeline   988 98[facility].org  Childhelp [place] Child Abuse Hotline   262.534.3131 Childhelphotline.org  National Sexual Assault Hotline   (370) 771-9998 (HOPE) Rainn.org  National Runaway Safeline   (924) 988-9295 (RUNAWAY) [facility].org  Pregnancy & Postpartum Support [place]   Call/text 391-608-9372 Ppsupportmn.org  Substance Abuse National Helpline ([facility]   682-289-HELP (8097) Findtreatment.gov  Emergency Services   911

## 2023-10-16 NOTE — PROGRESS NOTES
Assessment & Plan     Nodule of finger of right hand  Xray shows no concerns with bone.  Likely a ganglion cyst.  Referral placed to evaluate by  orthopedic.  - XR Finger Right G/E 2 Views; Future  - Orthopedic  Referral; Future    Dense breast tissue  Breast exam reveals dense breast tissue with no pain or tenderness.  Advised patient to monitor breast for tenderness, redness, or discharge. Discussed continuing to observe and follow-up if any changes with pain, tenderness or discharge.     Depression Screening Follow Up        10/16/2023     2:33 PM   PHQ   PHQ-9 Total Score 12   Q9: Thoughts of better off dead/self-harm past 2 weeks Not at all     Note by Florentin Pickens NP Student     I, Corine Chaney, was present with the NP student who participated in the service and in the documentationof the note.   I have verified the history and personally performed the physical exam and medical decision making.  I agree with the assessment and plan of care as documented in the note.     Corine Chaney NP on 10/17/2023 at 7:43 AM    Corine Chaney NP  [facility]    Kristina Cedeno is a 33 year old, presenting for the following health issues:  Breast Issue and finger Issue         10/16/2023     2:36 PM   Additional Questions   Roomed by Sari EGAN       Pao is a 33 years old female presenting today with 2 weeks history of painful right middle finger due to nodule. Patient reported decreased range of motion on the affected finger. She denied any recent injury to the finger and has not taken any medication to relieve the pain.    Breast Lump  Patient reported lump she noted on the right breast. She stated that she can only feel it when sitting up but will not note it when lying down. She denied any redness, tenderness, or discharge from the breast. Patient stated that she has no family history of breast cancer and denied any other symptoms at the this visit.    Healthy Habits:      Getting at least 3 servings of Calcium per day:  Yes    Bi-annual eye exam:  Yes    Dental care twice a year:  Yes    Sleep apnea or symptoms of sleep apnea:  Daytime drowsiness    Diet:  Regular (no restrictions)    Frequency of exercise:  None    Taking medications regularly:  Yes    Medication side effects:  Not applicable    Additional concerns today:  Yes       Concern - Breast Issue   Onset: 9/22/2023  Description: right breast under nipple has a pea sized limp   Intensity: mild  Progression of Symptoms:  same and constant  Accompanying Signs & Symptoms: none  Previous history of similar problem: none  Precipitating factors:        Worsened by: none  Alleviating factors:        Improved by: none  Therapies tried and outcome: None    Concern - Lump  Onset: 2 weeks   Description: right middle finger has a hard lump on the middle knuckle   Intensity: 8/10 on bad days, today is 7/10  Progression of Symptoms:  same and constant  Accompanying Signs & Symptoms: none  Previous history of similar problem: none  Precipitating factors:        Worsened by: none  Alleviating factors:        Improved by: none  Therapies tried and outcome: Ibuprofen has not helped      Review of Systems   Constitutional:  Negative for chills and fever.   HENT:  Positive for hearing loss. Negative for congestion, ear pain and sore throat.    Eyes:  Negative for pain and visual disturbance.   Respiratory:  Positive for cough. Negative for shortness of breath.    Cardiovascular:  Negative for chest pain, palpitations and peripheral edema.   Gastrointestinal:  Negative for abdominal pain, constipation, diarrhea, heartburn, hematochezia and nausea.   Breasts:  Positive for breast mass. Negative for tenderness and discharge.   Genitourinary:  Negative for dysuria, frequency, genital sores, hematuria, pelvic pain, urgency, vaginal bleeding and vaginal discharge.   Musculoskeletal:  Positive for arthralgias and joint swelling. Negative for  "myalgias.   Skin:  Negative for rash.   Neurological:  Positive for headaches. Negative for dizziness, weakness and paresthesias.   Psychiatric/Behavioral:  Negative for mood changes. The patient is not nervous/anxious.         Objective    /68   Pulse 78   Temp 97.6  F (36.4  C) (Tympanic)   Resp 20   Ht 1.803 m (5' 11\")   Wt 76.8 kg (169 lb 6.4 oz)   LMP 05/01/2017   SpO2 99%   BMI 23.63 kg/m    Body mass index is 23.63 kg/m .  Physical Exam  Vitals reviewed.   Constitutional:       Appearance: Normal appearance.   HENT:      Head: Normocephalic and atraumatic.   Cardiovascular:      Rate and Rhythm: Normal rate and regular rhythm.      Pulses: Normal pulses.      Heart sounds: Normal heart sounds.   Pulmonary:      Effort: Pulmonary effort is normal.      Breath sounds: Normal breath sounds.   Musculoskeletal:         General: Tenderness present.        Hands:       Comments: Nodule and tenderness noted on right middle finger.   Skin:     General: Skin is warm.   Neurological:      Mental Status: She is alert.   Psychiatric:         Mood and Affect: Mood normal.         Behavior: Behavior normal.        Answers submitted by the patient for this visit:  Patient Health Questionnaire (Submitted on 10/16/2023)  If you checked off any problems, how difficult have these problems made it for you to do your work, take care of things at home, or get along with other people?: Very difficult  PHQ9 TOTAL SCORE: 12  STEVAN-7 (Submitted on 10/16/2023)  STEVAN 7 TOTAL SCORE: 8    "

## 2023-10-16 NOTE — COMMUNITY RESOURCES LIST (ENGLISH)
10/16/2023   [facility]  N/A  For additional resource needs, please contact your health insurance member services or your primary care team.  Phone: [phone]   Email: N/A   Address: [address] [place] [address]   Hours: N/A        Food and Nutrition       Food pantry  1  Community Helping Hand - Food Shelf Distance: 4.49 miles      In-Person   [address] [place] [address]  Language: English  Hours: Mon - Sun Appt. Only  Fees: Free   Phone: (139) 418-5907 Email: edward@Coinex-IO.[employer] Environmental [employer] Website: http://www.[facility].org     2  Family Pathways Food Shelf - [facility] Distance: 5.11 miles      Delivery, Pick   [address] [place] [address]  Language: English  Hours: Mon 9:00 AM - 5:00 PM , Tue 11:00 AM - 5:00 PM , Thu 9:00 AM - 5:00 PM  Fees: Free   Phone: (959) 640-3805 Email: mail@Se[employer].V I O Website: https://www.[employer].V I O/our-work/food-access-and-equity/     SNAP application assistance  3  [facility] - [place] Family Investment Program (MFIP) - [place] Family Investment Program (MFIP) - SNAP application assistance Distance: 8.73 miles      In-Person, Phone/Virtual   [address] [address]  Language: English  Hours: Mon - Fri 8:00 AM - 4:30 PM  Fees: Free   Phone: (428) 417-3648 Email: imgeneralquestions@[facility].gov Website: https://www.[facility]./499/MFIP-Biennial-Service-Agreement-VCA-Plan     4  [facility] Action [place] ([facility]) - [place] Office Distance: 12.75 miles      In-Person, Phone/Virtual   [address] [place] [address]  Language: English  Hours: Mon - Fri 8:00 AM - 4:30 PM  Fees: Free   Phone: (807) 431-3912 Email: clifford@Amplion Clinical Communications Website: https://www.[facility].org/agency-information     Soup kitchen or free meals  5  [facility] - Thursday Night  Community Meal Distance: 19.36 miles      In-Person   [address] [place] [address]  Language: English, Japanese  Hours: Thu 6:00 PM - 7:00 PM  Fees: Free   Phone: (654) 727-2005 Email: [place]@saintandrews.org Website: https://www.saintandrews.org     6  [facility] - Family Table Meals - Family Table Meal Distance: 19.67 miles      Pickup   [address] [place] [address]  Language: English  Hours: Thu 5:00 PM - 6:30 PM  Fees: Free   Phone: (609) 738-3236 Email: minh@Lavaboom.Adfaces Website: http://www.Lavaboom.Adfaces          Important Numbers & Websites       [place] [address] 211itedway.org  Poison Control   (315) 691-3322 [place]poison.org  Suicide and Crisis Lifeline   988 98[facility].org  Childhelp [place] Child Abuse Hotline   342.325.3214 Childhelphotline.org  National Sexual Assault Hotline   (602) 959-7520 (HOPE) Rainn.org  National Runaway Safeline   (799) 897-6299 (RUNAWAY) [place] [facility] Cent[facility]unaway.org  Pregnancy & Postpartum Support [place]   Call/text 803-357-3249 Ppsupportmn.org  Substance Abuse National Helpline ([facility]   747-491-HELP (4683) Findtreatment.gov  Emergency Services   911

## 2023-10-19 ENCOUNTER — TELEPHONE (OUTPATIENT)
Dept: ORTHOPEDICS | Facility: CLINIC | Age: 33
End: 2023-10-19
Payer: COMMERCIAL

## 2023-10-21 NOTE — TELEPHONE ENCOUNTER
DIAGNOSIS: Nodule of finger of right hand [R22.31]  - Primary   APPOINTMENT DATE: 11/08/2023   NOTES STATUS DETAILS   OFFICE NOTE from referring provider Internal 10/16/2023 - Corine Chaney NP - [facility] FP   DISCHARGE REPORT from the ER Internal 07/12/2019 - [facility] ED   MEDICATION LIST Internal  Care Everywhere     XRAYS (IMAGES & REPORTS) Internal 10/16/2023 - RT Finger  03/20/2019 - RT Hand

## 2023-11-02 NOTE — PROGRESS NOTES
Chief Complaint:   Chief Complaint   Patient presents with    Consult     Nodule on Right middle finger       Referring Physician: Corine Chaney    Diagnosis: right middle finger mass     History of Present Illness: Pao Wood is a 33 year old RHD female presenting for evaluation of right middle finger mass over the dorsal PIP. No trauma, not sure how it started. She first noticed it approximately 1 month ago. She has pain with full flexion and pain with lateral stress of finger.     Clinical documentation by KENAN Chaney on 10/16/2023 was reviewed.    Occupation:     Past Medical History: No past medical history on file.    Past Surgical History:   Past Surgical History:   Procedure Laterality Date    GALLBLADDER SURGERY      hsyterecomty with tube removal and kept ovaries      TUBAL LIGATION         Medications:   Current Outpatient Medications:     ibuprofen (ADVIL/MOTRIN) 200 MG tablet, Take 600 mg by mouth, Disp: , Rfl:     Allergy:   Allergies   Allergen Reactions    Kahlua (Keoke Coffee) Flavor Difficulty breathing     Feels SOB    Latex Hives    Acetaminophen Nausea, Other (See Comments) and Nausea and Vomiting     Fever and Nausea  Fever (when she was a small child).      Aspirin Nausea, Other (See Comments) and Nausea and Vomiting     Nausea and Fever  Fever (when she was a small child)      No Clinical Screening - See Comments Other (See Comments)     kahlua chest feels like its caving in sob    Spinach Unknown and Nausea and Vomiting       Social History:   History   Smoking Status    Every Day    Packs/day: 0.50    Years: 15.00    Types: Cigarettes   Smokeless Tobacco    Not on file      Social History     Tobacco Use    Smoking status: Every Day     Packs/day: 0.50     Years: 15.00     Additional pack years: 0.00     Total pack years: 7.50     Types: Cigarettes   Vaping Use    Vaping Use: Never used   Substance Use Topics    Alcohol use: No    Drug use: Yes     Types:  Marijuana     Comment: occassional        Family History:   Family History   Problem Relation Age of Onset    Esophageal Cancer Mother     Thyroid Disease Mother     Myocardial Infarction Mother     Depression Mother     Migraines Mother     Myocardial Infarction Maternal Grandmother     Hyperlipidemia Maternal Grandmother     Hypertension Maternal Grandmother     Diabetes Maternal Grandmother     Hypertension Maternal Grandfather     Pancreatic Cancer Maternal Grandfather     Diabetes Paternal Grandmother     Hypertension Paternal Grandmother     Myocardial Infarction Paternal Grandfather     Hypertension Paternal Grandfather     Autism Spectrum Disorder Daughter        Physical Examination:  There were no vitals filed for this visit.  There is no height or weight on file to calculate BMI.    Well appearing, well nourished  Alert and oriented x 3, cooperative with exam     Right middle finger  Skin intact  Mass measuring approximately 4x4mm over dorsal ulnar PIP joint, firm, tender to palpation. PIP ROM 0-90, pain with more flexion  Motor Exam: Intact TU/OK/x2-3  Sensory Exam: Sensation intact to light touch in FDWS (radial), volar IF (median), volar SF (ulnar)  Vascular Exam: 2+ radial pulse, < 2 sec capillary refill    Imaging/Studies:  XR (3 views) of the right middle finger was obtained  10/16/2023 .  I reviewed the images with the patient.  The imaging study shows no fracture/dislocation.  Well maintained joint space.    Assessment: Pao Wood is a 33 year old female with right middle finger mass.    Plan:   I had a detailed discussion with the patient regarding my clinical findings, diagnosis, and treatment plan. I reviewed the treatment options for her mass (observation, excision, etc.) as well as the risks and benefits associated with each.  At this time, I recommend an MRI to further characterize the mass and aid in surgical plan. We reviewed the risks of surgery, which include but are not  limited to infection, bleeding, nerve injury, permanent numbness, recurrence, post-operative stiffness, surgical scar, CRPS, anesthetic complications, etc.  We also discussed that there is a possibility that the surgery will not be successful and will require repeat surgery. We reviewed post-operative pain management and rehabilitation. The patient understands and agrees to the treatment plan.  All questions answered.     MRI right hand  - Discussed smoking cessation     Next Visit:   Follow-up: After MRI.   Imaging: None.   Plan: Discuss surgical plan    ZEFERINO MD RUI

## 2023-11-08 ENCOUNTER — OFFICE VISIT (OUTPATIENT)
Dept: ORTHOPEDICS | Facility: CLINIC | Age: 33
End: 2023-11-08
Payer: COMMERCIAL

## 2023-11-08 ENCOUNTER — PRE VISIT (OUTPATIENT)
Dept: ORTHOPEDICS | Facility: CLINIC | Age: 33
End: 2023-11-08

## 2023-11-08 DIAGNOSIS — R22.31 NODULE OF FINGER OF RIGHT HAND: ICD-10-CM

## 2023-11-08 PROCEDURE — 99203 OFFICE O/P NEW LOW 30 MIN: CPT | Performed by: STUDENT IN AN ORGANIZED HEALTH CARE EDUCATION/TRAINING PROGRAM

## 2023-11-08 NOTE — LETTER
11/8/2023         RE: Pao Wood  [address] [facility] [address]        Dear Colleague,    Thank you for referring your patient, Pao Wood, to the [facility] [place]. Please see a copy of my visit note below.    Chief Complaint:   Chief Complaint   Patient presents with    Consult     Nodule on Right middle finger       Referring Physician: Corine Chaney    Diagnosis: right middle finger mass     History of Present Illness: Pao Wood is a 33 year old RHD female presenting for evaluation of right middle finger mass over the dorsal PIP. No trauma, not sure how it started. She first noticed it approximately 1 month ago. She has pain with full flexion and pain with lateral stress of finger.     Clinical documentation by KENAN Chaney on 10/16/2023 was reviewed.    Occupation:     Past Medical History: No past medical history on file.    Past Surgical History:   Past Surgical History:   Procedure Laterality Date    GALLBLADDER SURGERY      hsyterecomty with tube removal and kept ovaries      TUBAL LIGATION         Medications:   Current Outpatient Medications:     ibuprofen (ADVIL/MOTRIN) 200 MG tablet, Take 600 mg by mouth, Disp: , Rfl:     Allergy:   Allergies   Allergen Reactions    Kahlua (Keoke Coffee) Flavor Difficulty breathing     Feels SOB    Latex Hives    Acetaminophen Nausea, Other (See Comments) and Nausea and Vomiting     Fever and Nausea  Fever (when she was a small child).      Aspirin Nausea, Other (See Comments) and Nausea and Vomiting     Nausea and Fever  Fever (when she was a small child)      No Clinical Screening - See Comments Other (See Comments)     kahlua chest feels like its caving in sob    Spinach Unknown and Nausea and Vomiting       Social History:   History   Smoking Status    Every Day    Packs/day: 0.50    Years: 15.00    Types: Cigarettes   Smokeless Tobacco    Not on file      Social History      Tobacco Use    Smoking status: Every Day     Packs/day: 0.50     Years: 15.00     Additional pack years: 0.00     Total pack years: 7.50     Types: Cigarettes   Vaping Use    Vaping Use: Never used   Substance Use Topics    Alcohol use: No    Drug use: Yes     Types: Marijuana     Comment: occassional        Family History:   Family History   Problem Relation Age of Onset    Esophageal Cancer Mother     Thyroid Disease Mother     Myocardial Infarction Mother     Depression Mother     Migraines Mother     Myocardial Infarction Maternal Grandmother     Hyperlipidemia Maternal Grandmother     Hypertension Maternal Grandmother     Diabetes Maternal Grandmother     Hypertension Maternal Grandfather     Pancreatic Cancer Maternal Grandfather     Diabetes Paternal Grandmother     Hypertension Paternal Grandmother     Myocardial Infarction Paternal Grandfather     Hypertension Paternal Grandfather     Autism Spectrum Disorder Daughter        Physical Examination:  There were no vitals filed for this visit.  There is no height or weight on file to calculate BMI.    Well appearing, well nourished  Alert and oriented x 3, cooperative with exam     Right middle finger  Skin intact  Mass measuring approximately 4x4mm over dorsal ulnar PIP joint, firm, tender to palpation. PIP ROM 0-90, pain with more flexion  Motor Exam: Intact TU/OK/x2-3  Sensory Exam: Sensation intact to light touch in FDWS (radial), volar IF (median), volar SF (ulnar)  Vascular Exam: 2+ radial pulse, < 2 sec capillary refill    Imaging/Studies:  XR (3 views) of the right middle finger was obtained  10/16/2023 .  I reviewed the images with the patient.  The imaging study shows no fracture/dislocation.  Well maintained joint space.    Assessment: Pao Wood is a 33 year old female with right middle finger mass.    Plan:   I had a detailed discussion with the patient regarding my clinical findings, diagnosis, and treatment plan. I reviewed the  treatment options for her mass (observation, excision, etc.) as well as the risks and benefits associated with each.  At this time, I recommend an MRI to further characterize the mass and aid in surgical plan. We reviewed the risks of surgery, which include but are not limited to infection, bleeding, nerve injury, permanent numbness, recurrence, post-operative stiffness, surgical scar, CRPS, anesthetic complications, etc.  We also discussed that there is a possibility that the surgery will not be successful and will require repeat surgery. We reviewed post-operative pain management and rehabilitation. The patient understands and agrees to the treatment plan.  All questions answered.     MRI right hand  - Discussed smoking cessation     Next Visit:   Follow-up: After MRI.   Imaging: None.   Plan: Discuss surgical plan    ZEFERINO FABIAN MD

## 2023-11-08 NOTE — NURSING NOTE
Reason For Visit:   Chief Complaint   Patient presents with    Consult     Nodule on Right middle finger       Primary MD: Marissa Pedro  Ref. MD: Corine Chaney  NP    Age: 33 year old    ?  No      LMP 05/01/2017       Pain Assessment  Patient Currently in Pain: Yes  0-10 Pain Scale: 5  Primary Pain Location: Finger (Comment which one) (Right middle)  Pain Descriptors: Sharp, Constant    Hand Dominance Evaluation  Hand Dominance: Right          QuickDASH Assessment      11/1/2023     9:28 AM   QuickDASH Main   1. Open a tight or new jar Severe difficulty   2. Do heavy household chores (e.g., wash walls, floors) Severe difficulty   3. Carry a shopping bag or briefcase Moderate difficulty   4. Wash your back Mild difficulty   5. Use a knife to cut food Mild difficulty   6. Recreational activities in which you take some force or impact through your arm, shoulder or hand (e.g., golf, hammering, tennis, etc.) Moderate difficulty   7. During the past week, to what extent has your arm, shoulder or hand problem interfered with your normal social activities with family, friends, neighbours or groups Extremely   8. During the past week, were you limited in your work or other regular daily activities as a result of your arm, shoulder or hand problem Very limited   9. Arm, shoulder or hand pain Extreme   10.Tingling (pins and needles) in your arm,shoulder or hand Moderate   11. During the past week, how much difficulty have you had sleeping because of the pain in your arm, shoulder or hand Unable to answer   Quickdash Ability Score 54.55          Current Outpatient Medications   Medication Sig Dispense Refill    ibuprofen (ADVIL/MOTRIN) 200 MG tablet Take 600 mg by mouth         Allergies   Allergen Reactions    Kahlua (Keoke Coffee) Flavor Difficulty breathing     Feels SOB    Latex Hives    Acetaminophen Nausea, Other (See Comments) and Nausea and Vomiting     Fever and Nausea  Fever (when she was a  small child).      Aspirin Nausea, Other (See Comments) and Nausea and Vomiting     Nausea and Fever  Fever (when she was a small child)      No Clinical Screening - See Comments Other (See Comments)     kahlua chest feels like its caving in sob    Spinach Unknown and Nausea and Vomiting       JONATHAN THOMAS, ATC

## 2023-11-18 ENCOUNTER — HOSPITAL ENCOUNTER (OUTPATIENT)
Dept: MRI IMAGING | Facility: CLINIC | Age: 33
Discharge: HOME | End: 2023-11-18
Attending: STUDENT IN AN ORGANIZED HEALTH CARE EDUCATION/TRAINING PROGRAM | Admitting: STUDENT IN AN ORGANIZED HEALTH CARE EDUCATION/TRAINING PROGRAM
Payer: COMMERCIAL

## 2023-11-18 DIAGNOSIS — R22.31 NODULE OF FINGER OF RIGHT HAND: ICD-10-CM

## 2023-11-18 PROCEDURE — 73218 MRI UPPER EXTREMITY W/O DYE: CPT | Mod: RT

## 2023-11-21 NOTE — H&P (VIEW-ONLY)
Chief Complaint:   No chief complaint on file.      Referring Physician: Corine Chaney    Diagnosis: right middle finger mass     History of Present Illness: Pao Wood is a 33 year old RHD female presenting for evaluation of right middle finger mass over the dorsal PIP. No trauma, not sure how it started. She first noticed it approximately 1 month ago. She has pain with full flexion and pain with lateral stress of finger.     Clinical documentation by KENAN Chaney on 10/16/2023 was reviewed.    11/28/2023: video visit for MRI review. Start time approximately 240pm, end time 247pm. Reports symptoms are the same    Occupation:     Physical Examination: (unable to perform due to telephone visit, exam below is from prior)  There were no vitals filed for this visit.  There is no height or weight on file to calculate BMI.    Well appearing, well nourished  Alert and oriented x 3, cooperative with exam     Right middle finger  Skin intact  Mass measuring approximately 4x4mm over dorsal ulnar PIP joint, firm, tender to palpation. PIP ROM 0-90, pain with more flexion  Motor Exam: Intact TU/OK/x2-3  Sensory Exam: Sensation intact to light touch in FDWS (radial), volar IF (median), volar SF (ulnar)  Vascular Exam: 2+ radial pulse, < 2 sec capillary refill    Imaging/Studies:  MRI right hand obtained 11/18/2023 shows:  1.  Small, 5 6 mm cystic lesion over the dorsal aspect of the third finger PIP joint, appears to dissect within the joint capsule and the extensor tendon, and is suspicious for a small ganglion or tendon sheath cyst. There is additional mild T2 signal   intensity within the subcutaneous tissues over the dorsal aspect of the wrist, which may represent additional more ill-defined cystic change or edema. Clinical follow-up recommended. If this is symptomatic or enlarges, follow-up ultrasound could be   considered to ensure a simple cyst, and percutaneous aspiration could be considered if  indicated.  2.  No solid nodule or mass identified.  3.  Normal bones and joints. No fracture, erosion, or marrow signal abnormality. Normal joint fluid with no significant arthritic changes.    XR (3 views) of the right middle finger was obtained  10/16/2023 .  I reviewed the images with the patient.  The imaging study shows no fracture/dislocation.  Well maintained joint space.    Assessment: Pao Wood is a 33 year old female with right middle finger mass.    Plan:   I had a detailed discussion with the patient regarding my clinical findings, diagnosis, and treatment plan. I reviewed the treatment options for her mass (observation, excision, etc.) as well as the risks and benefits associated with each.  Due to persistent pain/discomfort with ADLs, the patient would like the mass removed.  I had a thorough discussion today with the patient with regard to the intended surgical procedure. We reviewed the risks of surgery, which include but are not limited to infection, bleeding, nerve injury, permanent numbness, recurrence, post-operative stiffness, surgical scar, CRPS, anesthetic complications, etc.  We also discussed that there is a possibility that the surgery will not be successful and will require repeat surgery. We reviewed post-operative pain management and rehabilitation. The patient understands and agrees to the treatment plan.  All questions answered.      Plan for operative intervention in the form of right middle finger mass excision  - Urged smoking cessation     Next Visit:   Follow-up: 7-10 days after surgery.   Imaging: None.   Plan: Wound check.      ZEFERINO FABIAN MD

## 2023-11-28 ENCOUNTER — VIRTUAL VISIT (OUTPATIENT)
Dept: ORTHOPEDICS | Facility: CLINIC | Age: 33
End: 2023-11-28
Payer: COMMERCIAL

## 2023-11-28 DIAGNOSIS — M67.442 GANGLION, FINGER JOINT OF LEFT HAND: Primary | ICD-10-CM

## 2023-11-28 PROCEDURE — 99214 OFFICE O/P EST MOD 30 MIN: CPT | Mod: VID | Performed by: STUDENT IN AN ORGANIZED HEALTH CARE EDUCATION/TRAINING PROGRAM

## 2023-11-28 NOTE — NURSING NOTE
Teaching Flowsheet   Teaching completed via telephone. Surgery packet mailed.     Relevant Diagnosis: Ganglion cyst of right middle finger  Teaching Topic: Ganglion cyst excision of right middle finger    CSC under local anesthetic with Dr Irene Lu.  Smoking cessation handout provided in folder.    Person(s) involved in teaching:   Patient     Motivation Level:  Asks Questions: Yes  Eager to Learn: Yes  Cooperative: Yes  Receptive (willing/able to accept information): Yes  Any cultural factors/Jew beliefs that may influence understanding or compliance? No    Patient demonstrates understanding of the following:  Reason for the appointment, diagnosis and treatment plan: Yes  Knowledge of proper use of medications and conditions for which they are ordered (with special attention to potential side effects or drug interactions): Yes  Which situations necessitate calling provider and whom to contact: Yes    Teaching Concerns Addressed:   Proper use and care of  (medical equip, care aids, etc.): Yes  Nutritional needs and diet plan: Yes  Pain management techniques: Yes  Wound Care: Yes  How and/when to access community resources: Yes     Instructional Materials Used/Given: Preoperative surgery packet mailed, patient will need to purchase or  antibacterial Chlorhexidine soap. Stop Light Tool reviewed, after-hours number provided, patient verbalized understanding, had no immediate questions. Shruti Dong, RN

## 2023-11-28 NOTE — LETTER
11/28/2023         RE: Pao Wood  [address] [facility] [address]        Dear Colleague,    Thank you for referring your patient, Pao Wood, to the [facility] [place]. Please see a copy of my visit note below.    Chief Complaint:   No chief complaint on file.      Referring Physician: Corine Chaney    Diagnosis: right middle finger mass     History of Present Illness: Pao Wood is a 33 year old RHD female presenting for evaluation of right middle finger mass over the dorsal PIP. No trauma, not sure how it started. She first noticed it approximately 1 month ago. She has pain with full flexion and pain with lateral stress of finger.     Clinical documentation by KENAN Chaney on 10/16/2023 was reviewed.    11/28/2023: video visit for MRI review. Start time approximately 240pm, end time 247pm. Reports symptoms are the same    Occupation:     Physical Examination: (unable to perform due to telephone visit, exam below is from prior)  There were no vitals filed for this visit.  There is no height or weight on file to calculate BMI.    Well appearing, well nourished  Alert and oriented x 3, cooperative with exam     Right middle finger  Skin intact  Mass measuring approximately 4x4mm over dorsal ulnar PIP joint, firm, tender to palpation. PIP ROM 0-90, pain with more flexion  Motor Exam: Intact TU/OK/x2-3  Sensory Exam: Sensation intact to light touch in FDWS (radial), volar IF (median), volar SF (ulnar)  Vascular Exam: 2+ radial pulse, < 2 sec capillary refill    Imaging/Studies:  MRI right hand obtained 11/18/2023 shows:  1.  Small, 5 6 mm cystic lesion over the dorsal aspect of the third finger PIP joint, appears to dissect within the joint capsule and the extensor tendon, and is suspicious for a small ganglion or tendon sheath cyst. There is additional mild T2 signal   intensity within the subcutaneous tissues over the dorsal aspect  of the wrist, which may represent additional more ill-defined cystic change or edema. Clinical follow-up recommended. If this is symptomatic or enlarges, follow-up ultrasound could be   considered to ensure a simple cyst, and percutaneous aspiration could be considered if indicated.  2.  No solid nodule or mass identified.  3.  Normal bones and joints. No fracture, erosion, or marrow signal abnormality. Normal joint fluid with no significant arthritic changes.    XR (3 views) of the right middle finger was obtained  10/16/2023 .  I reviewed the images with the patient.  The imaging study shows no fracture/dislocation.  Well maintained joint space.    Assessment: Pao Wood is a 33 year old female with right middle finger mass.    Plan:   I had a detailed discussion with the patient regarding my clinical findings, diagnosis, and treatment plan. I reviewed the treatment options for her mass (observation, excision, etc.) as well as the risks and benefits associated with each.  Due to persistent pain/discomfort with ADLs, the patient would like the mass removed.  I had a thorough discussion today with the patient with regard to the intended surgical procedure. We reviewed the risks of surgery, which include but are not limited to infection, bleeding, nerve injury, permanent numbness, recurrence, post-operative stiffness, surgical scar, CRPS, anesthetic complications, etc.  We also discussed that there is a possibility that the surgery will not be successful and will require repeat surgery. We reviewed post-operative pain management and rehabilitation. The patient understands and agrees to the treatment plan.  All questions answered.      Plan for operative intervention in the form of right middle finger mass excision  - Urged smoking cessation     Next Visit:   Follow-up: 7-10 days after surgery.   Imaging: None.   Plan: Wound check.    ZEFERINO FABIAN MD

## 2023-11-29 ENCOUNTER — TELEPHONE (OUTPATIENT)
Dept: ORTHOPEDICS | Facility: CLINIC | Age: 33
End: 2023-11-29
Payer: COMMERCIAL

## 2023-11-29 PROBLEM — M67.442 GANGLION, FINGER JOINT OF LEFT HAND: Status: ACTIVE | Noted: 2023-11-28

## 2023-11-29 NOTE — TELEPHONE ENCOUNTER
Patient is scheduled for surgery with Dr. Lu    Spoke with: Patient    Date of Surgery: 12/6/23    Location: [facility]    Informed patient they will need an adult  Yes    Pre op with Provider No    Additional imaging/appointments: POP made    Surgery packet: Mailed     Additional comments: N/A        Anna Wan on 11/29/2023 at 9:13 AM

## 2023-11-29 NOTE — TELEPHONE ENCOUNTER
Patient wondering what to expect for working post op.  Having ganglion cyst excision on middle finger under local anesthetic on Wed 12-6-23.  She is a manager at retail store.    Advised she can work if tolerated, she might want to think about taking the next day off, but if not possible she can work but have limited use of the finger/light duty until it feels better.  Patient verbalizes understanding.  She will avoid heavy lifting, pushing, pulling.  She will do tasks as tolerated and limited by her pain.  If she needs a letter or formal statement she will let us know.Shruti Dong RN

## 2023-12-05 ENCOUNTER — ANESTHESIA EVENT (OUTPATIENT)
Dept: SURGERY | Facility: AMBULATORY SURGERY CENTER | Age: 33
End: 2023-12-05
Payer: COMMERCIAL

## 2023-12-06 ENCOUNTER — HOSPITAL ENCOUNTER (OUTPATIENT)
Facility: AMBULATORY SURGERY CENTER | Age: 33
Discharge: HOME | End: 2023-12-06
Attending: STUDENT IN AN ORGANIZED HEALTH CARE EDUCATION/TRAINING PROGRAM | Admitting: STUDENT IN AN ORGANIZED HEALTH CARE EDUCATION/TRAINING PROGRAM
Payer: COMMERCIAL

## 2023-12-06 ENCOUNTER — ANESTHESIA (OUTPATIENT)
Dept: SURGERY | Facility: AMBULATORY SURGERY CENTER | Age: 33
End: 2023-12-06
Payer: COMMERCIAL

## 2023-12-06 VITALS
SYSTOLIC BLOOD PRESSURE: 115 MMHG | RESPIRATION RATE: 16 BRPM | HEIGHT: 62 IN | TEMPERATURE: 97.3 F | OXYGEN SATURATION: 98 % | WEIGHT: 168 LBS | HEART RATE: 79 BPM | BODY MASS INDEX: 30.91 KG/M2 | DIASTOLIC BLOOD PRESSURE: 72 MMHG

## 2023-12-06 DIAGNOSIS — M67.442 GANGLION, FINGER JOINT OF LEFT HAND: Primary | ICD-10-CM

## 2023-12-06 PROCEDURE — 88304 TISSUE EXAM BY PATHOLOGIST: CPT | Mod: TC | Performed by: STUDENT IN AN ORGANIZED HEALTH CARE EDUCATION/TRAINING PROGRAM

## 2023-12-06 PROCEDURE — 88304 TISSUE EXAM BY PATHOLOGIST: CPT | Mod: 26 | Performed by: PATHOLOGY

## 2023-12-06 PROCEDURE — 26160 REMOVE TENDON SHEATH LESION: CPT | Mod: F7

## 2023-12-06 RX ORDER — LIDOCAINE HYDROCHLORIDE 10 MG/ML
5 INJECTION, SOLUTION EPIDURAL; INFILTRATION; INTRACAUDAL; PERINEURAL ONCE
Status: COMPLETED | OUTPATIENT
Start: 2023-12-06 | End: 2023-12-06

## 2023-12-06 RX ORDER — BUPIVACAINE HYDROCHLORIDE 5 MG/ML
5 INJECTION, SOLUTION EPIDURAL; INTRACAUDAL ONCE
Status: COMPLETED | OUTPATIENT
Start: 2023-12-06 | End: 2023-12-06

## 2023-12-06 RX ORDER — IBUPROFEN 800 MG/1
800 TABLET, FILM COATED ORAL EVERY 8 HOURS PRN
Qty: 30 TABLET | Refills: 1 | Status: SHIPPED | OUTPATIENT
Start: 2023-12-06

## 2023-12-06 RX ADMIN — LIDOCAINE HYDROCHLORIDE 5 ML: 10 INJECTION, SOLUTION EPIDURAL; INFILTRATION; INTRACAUDAL; PERINEURAL at 07:05

## 2023-12-06 RX ADMIN — BUPIVACAINE HYDROCHLORIDE 25 MG: 5 INJECTION, SOLUTION EPIDURAL; INTRACAUDAL at 07:05

## 2023-12-06 NOTE — OP NOTE
OPERATIVE REPORT    PATIENT NAME: Pao Wood  MRN: [id number]    DATE: 12/6/2023    PREOPERATIVE DIAGNOSIS:   1. Right middle finger mass    POSTOPERATIVE DIAGNOSIS:   1. Right middle finger mass    OPERATION:   1. Excision of subcutaneous digit mass    SURGEON: Irene Lu MD    ASSISTANT: Betty Galindo PA-C    The physician assistant was necessary for the procedure. They placed and held retractors to provide adequate exposure that allowed the case to proceed in a safe, efficient manner. They assisted in identifying and protecting important structures. They ligated blood vessels to maintain hemostasis and minimize bleeding risk. They suctioned fluids when needed. They assisted with the proper selection and positioning of any implants required for the case. They performed or assisted with wound closure of the operative incisions. An experienced physician assistant was medically necessary to ensure a safe and efficient procedure. The assistance that they provided decreased operative time and thereby, reduced the risk of infection and complications from prolonged time of anesthesia. Their assistance was vital in achieving best practices. No qualified residents or fellows were available to assist with this case.     STAFF: Circulator: Jojo Medina RN; Chandana Fong RN  Scrub Person: Patrica Rosales  Staff Assist: Gregoria Jimenes CMA    ANESTHESIA: Local    IMPLANTS: * No implants in log *    ESTIMATED BLOOD LOSS: 1 mL    FLUIDS: See anesthesia records.     URINE OUTPUT: Not applicable.  Paige was not placed.     TOURNIQUET TIME: 7 minutes.    COMPLICATION: None.     SPECIMEN: right middle finger mass in formalin    INDICATIONS: Pao Wood is a 33 year old female who developed a right middle finger mass which has been symptomatic.  After a thorough evaluation and discussion of treatment options, the patient was indicated for operative intervention.  The risks, benefits, and  alternatives were discussed with the patient.  The patient verbalized understanding of the treatment plan and signed the consent.    DESCRIPTION OF PROCEDURE: The surgical site was prepped with alcohol and a digital block using 1% lidocaine and 0.25% bupivacaine was performed. The patient was taken to the operating room and placed in supine position on the operating table.  The right upper extremity was prepped and draped in the usual sterile fashion.  A timeout was performed with all OR staff.  The patient name, MRN, operative extremity, procedure, allergies, antibiotics, DVT prophylaxis, and fire precautions/plan were reviewed, and all were in agreement.     A finger based tourniquet was placed on the base of the right middle finger. A longitudinal incision was made overlying the mass at the PIP joint. Dissection was carried through the subcutaneous tissues. The mass was identified and  from adjacent tissue along an areolar plane circumferentially.  The mass was completely resected and sent to pathology.  The wound was copiously irrigated.  The tourniquet was released. Closure was performed with buried 4-0 Monocryl suture.  A sterile dressing and splint were applied.  Capillary refill was intact, < 2 seconds.     All counts were correct at the end of the case.       There were no complications.    POSTOPERATIVE PLAN: return to clinic in 1-2 weeks for wound check, remove splint    ZEFERINO FABIAN MD

## 2023-12-06 NOTE — DISCHARGE INSTRUCTIONS
DIAGNOSIS  1. Ganglion, finger joint of left hand        MEDICATIONS   Resume all home medications as directed unless otherwise instructed during this hospitalization. If there is any question, double check with your primary care provider.  Start new discharge medications as directed.    Take 1 tablet of 650 mg Tylenol (acetaminophen) Arthritis Strength (extended release) and 1 tablet of Aleve (naproxen) 220 mg in the morning with breakfast and in the evening with dinner.     For breakthrough pain use narcotic pain medication as prescribed.    Do not drive or operate machinery while taking narcotic pain medications.   If you are taking other Tylenol containing medicines at home, be sure NOT to exceed 4 gram's (4000 milligrams) of Tylenol per day.   If you are taking pain medications, be sure to take Colace (docusate sodium) as well to prevent constipation. If constipated, try adding another cathartic or enema.  If nausea and vomiting, call the hospital or seek medical attention.    ACTIVITY   Weight bearing: Non-weight bearing to arm.    DIET  Resume same diet prior to your hospital admission.    WOUND   Leave dressing on until you are seen in clinic for your follow up visit.   Watch for signs and symptoms of infection of your wounds including; pain, redness, swelling, drainage or fever.  If you notice any of these symptoms please call or seek medical attention.    Keep wound clean, dry, and intact.  Do not submerge wounds in water until they are healed. No baths, soaking, swimming, or prolonged water exposure for 4 weeks after surgery.    RETURN   Follow-up with Orthopedic Clinic as directed.     Future Appointments   Date Time Provider Department [place]   12/13/2023  3:15 PM Irene Lu MD [facility]       Call the [facility] at [phone] during business hours for any symptoms such as:    * Fevers with Temperature greater than 101.5 degrees.   * Pus drainage from wound site.   *  Severe pain, not controlled by medication.   * Persistent nausea, vomiting and inablility to tolerate fluids.    If you are receiving care in [place], you may call the Orthopedic clinic at [phone].    FOR URGENT PROBLEMS ONLY, after hours or on weekends call the hospital  at [phone] and ask to speak with the orthopedic resident on call.        [facility]  Home Care Following Your Procedure  Call a doctor if you have signs of infection (fever, growing tenderness at the surgery site, a large amount of drainage or bleeding, severe pain, foul-smelling drainage, redness, swelling).             Tylenol/Acetaminophen Consumption    If you feel your pain relief is insufficient, you may take Tylenol/Acetaminophen in addition to your narcotic pain medication.   Be careful not to exceed 4,000 mg of Tylenol/Acetaminophen in a 24 hour period from all sources.  If you are taking extra strength Tylenol/acetaminophen (500 mg), the maximum dose is 8 tablets in 24 hours.  If you are taking regular strength acetaminophen (325 mg), the maximum dose is 12 tablets in 24 hours.      Your doctor is:       Dr. Irene Lu, Orthopaedics: [phone]    (Monday-Friday 8 am to 4 pm)  Or for After Hours concerns: dial [phone] and ask for the resident on call for:  Orthopaedics  For emergency care, call the:  [facility]: [phone] (TTY for hearing impaired: [phone])

## 2023-12-07 NOTE — PROGRESS NOTES
Chief Complaint:   Chief Complaint   Patient presents with    Surgical Followup     Follow up on excision of subcutaneous digit mass of right middle finger DOS 12/6/23     Referring Physician: Corine Chaney    Diagnosis: right middle finger mass   Surgery: 12/6/2023: right middle finger mass excision    History of Present Illness: Pao Wood is a 33 year old RHD female presenting for evaluation of right middle finger mass over the dorsal PIP. No trauma, not sure how it started. She first noticed it approximately 1 month ago. She has pain with full flexion and pain with lateral stress of finger.     Clinical documentation by KENAN Chaney on 10/16/2023 was reviewed.    11/28/2023: video visit for MRI review. Start time approximately 240pm, end time 247pm. Reports symptoms are the same    12/13/2023: first postoperative visit. Reports he has been well-controlled.  Her dressings off couple days ago when she is sleeping.  No numbness or tingling    Occupation:     Physical Examination:   There were no vitals filed for this visit.  There is no height or weight on file to calculate BMI.    Well appearing, well nourished  Alert and oriented x 3, cooperative with exam     Right middle finger  Incision healing well without evidence of infection or recurrence  Motor Exam: Intact TU/OK/x2-3  Sensory Exam: Sensation intact to light touch in FDWS (radial), volar IF (median), volar SF (ulnar), intact at tip of middle finger  Vascular Exam: 2+ radial pulse, < 2 sec capillary refill    Imaging/Studies:  Pathology 12/6/2023 shows: ganglion cyst    MRI right hand obtained 11/18/2023 shows:  1.  Small, 5 6 mm cystic lesion over the dorsal aspect of the third finger PIP joint, appears to dissect within the joint capsule and the extensor tendon, and is suspicious for a small ganglion or tendon sheath cyst. There is additional mild T2 signal   intensity within the subcutaneous tissues over the dorsal aspect of  the wrist, which may represent additional more ill-defined cystic change or edema. Clinical follow-up recommended. If this is symptomatic or enlarges, follow-up ultrasound could be   considered to ensure a simple cyst, and percutaneous aspiration could be considered if indicated.  2.  No solid nodule or mass identified.  3.  Normal bones and joints. No fracture, erosion, or marrow signal abnormality. Normal joint fluid with no significant arthritic changes.    XR (3 views) of the right middle finger was obtained  10/16/2023 .  I reviewed the images with the patient.  The imaging study shows no fracture/dislocation.  Well maintained joint space.    Assessment: Pao Wood is a 33 year old female with right middle finger mass.    Plan:   I had a discussion with the patient regarding my clinical findings, diagnosis, and treatment plan. We reviewed the post-operative plan, frequency of follow-up, rehabilitation, and expected outcomes.  All questions answered.     WBAT right upper extremity but avoid the use of middle finger for    OK to wash hands/shower.  Do not submerge incision until 4 weeks from the date of surgery.   Reviewed finger ROM    Next Visit:   Follow-up: 3 week(s).   Imaging: None.   Plan: wound and symptom check.    GLENDA LE PA-C  Orthopaedic Surgery

## 2023-12-12 LAB
PATH REPORT.COMMENTS IMP SPEC: NORMAL
PATH REPORT.COMMENTS IMP SPEC: NORMAL
PATH REPORT.FINAL DX SPEC: NORMAL
PATH REPORT.GROSS SPEC: NORMAL
PATH REPORT.MICROSCOPIC SPEC OTHER STN: NORMAL
PATH REPORT.RELEVANT HX SPEC: NORMAL
PHOTO IMAGE: NORMAL

## 2023-12-13 ENCOUNTER — OFFICE VISIT (OUTPATIENT)
Dept: ORTHOPEDICS | Facility: CLINIC | Age: 33
End: 2023-12-13
Payer: COMMERCIAL

## 2023-12-13 DIAGNOSIS — Z98.890 POST-OPERATIVE STATE: Primary | ICD-10-CM

## 2023-12-13 DIAGNOSIS — M67.442 GANGLION, FINGER JOINT OF LEFT HAND: ICD-10-CM

## 2023-12-13 PROCEDURE — 99024 POSTOP FOLLOW-UP VISIT: CPT | Performed by: STUDENT IN AN ORGANIZED HEALTH CARE EDUCATION/TRAINING PROGRAM

## 2023-12-13 NOTE — NURSING NOTE
Reason For Visit:   Chief Complaint   Patient presents with    Surgical Followup     Follow up on excision of subcutaneous digit mass of right middle finger DOS 12/6/23       Primary MD: Marissa Pedro  Ref. MD: yanira    Age: 33 year old    ?  No      LMP 05/01/2017       Pain Assessment  Patient Currently in Pain: No (denies pain for the last three days, annoyed by the splint)    Hand Dominance Evaluation  Hand Dominance: Right          QuickDASH Assessment      11/1/2023     9:28 AM   QuickDASH Main   1. Open a tight or new jar Severe difficulty   2. Do heavy household chores (e.g., wash walls, floors) Severe difficulty   3. Carry a shopping bag or briefcase Moderate difficulty   4. Wash your back Mild difficulty   5. Use a knife to cut food Mild difficulty   6. Recreational activities in which you take some force or impact through your arm, shoulder or hand (e.g., golf, hammering, tennis, etc.) Moderate difficulty   7. During the past week, to what extent has your arm, shoulder or hand problem interfered with your normal social activities with family, friends, neighbours or groups Extremely   8. During the past week, were you limited in your work or other regular daily activities as a result of your arm, shoulder or hand problem Very limited   9. Arm, shoulder or hand pain Extreme   10.Tingling (pins and needles) in your arm,shoulder or hand Moderate   11. During the past week, how much difficulty have you had sleeping because of the pain in your arm, shoulder or hand Unable to answer   Quickdash Ability Score 54.55          Current Outpatient Medications   Medication Sig Dispense Refill    ibuprofen (ADVIL/MOTRIN) 200 MG tablet Take 600 mg by mouth      ibuprofen (ADVIL/MOTRIN) 800 MG tablet Take 1 tablet (800 mg) by mouth every 8 hours as needed for moderate pain 30 tablet 1       Allergies   Allergen Reactions    Kahlua (Keoke Coffee) Flavor Difficulty breathing     Feels SOB    Latex Hives     Acetaminophen Nausea, Other (See Comments) and Nausea and Vomiting     Fever and Nausea  Fever (when she was a small child).      Aspirin Nausea, Other (See Comments) and Nausea and Vomiting     Nausea and Fever  Fever (when she was a small child)      No Clinical Screening - See Comments Other (See Comments)     kahlua chest feels like its caving in sob    Spinach Unknown and Nausea and Vomiting       Mary Waddell, ATC

## 2023-12-13 NOTE — LETTER
12/13/2023       RE: Pao Wood  [address] [facility] [address]    Dear Colleague,    Thank you for referring your patient, Pao Wood, to the [facility] [place]. Please see a copy of my visit note below.    Chief Complaint:   Chief Complaint   Patient presents with    Surgical Followup     Follow up on excision of subcutaneous digit mass of right middle finger DOS 12/6/23     Referring Physician: Corine Chaney    Diagnosis: right middle finger mass   Surgery: 12/6/2023: right middle finger mass excision    History of Present Illness: Pao Wood is a 33 year old RHD female presenting for evaluation of right middle finger mass over the dorsal PIP. No trauma, not sure how it started. She first noticed it approximately 1 month ago. She has pain with full flexion and pain with lateral stress of finger.     Clinical documentation by KENAN Chaney on 10/16/2023 was reviewed.    11/28/2023: video visit for MRI review. Start time approximately 240pm, end time 247pm. Reports symptoms are the same    12/13/2023: first postoperative visit. Reports he has been well-controlled.  Her dressings off couple days ago when she is sleeping.  No numbness or tingling    Occupation:     Physical Examination:   There were no vitals filed for this visit.  There is no height or weight on file to calculate BMI.    Well appearing, well nourished  Alert and oriented x 3, cooperative with exam     Right middle finger  Incision healing well without evidence of infection or recurrence  Motor Exam: Intact TU/OK/x2-3  Sensory Exam: Sensation intact to light touch in FDWS (radial), volar IF (median), volar SF (ulnar), intact at tip of middle finger  Vascular Exam: 2+ radial pulse, < 2 sec capillary refill    Imaging/Studies:  Pathology 12/6/2023 shows: ganglion cyst    MRI right hand obtained 11/18/2023 shows:  1.  Small, 5 6 mm cystic lesion over the dorsal aspect  of the third finger PIP joint, appears to dissect within the joint capsule and the extensor tendon, and is suspicious for a small ganglion or tendon sheath cyst. There is additional mild T2 signal   intensity within the subcutaneous tissues over the dorsal aspect of the wrist, which may represent additional more ill-defined cystic change or edema. Clinical follow-up recommended. If this is symptomatic or enlarges, follow-up ultrasound could be   considered to ensure a simple cyst, and percutaneous aspiration could be considered if indicated.  2.  No solid nodule or mass identified.  3.  Normal bones and joints. No fracture, erosion, or marrow signal abnormality. Normal joint fluid with no significant arthritic changes.    XR (3 views) of the right middle finger was obtained  10/16/2023 .  I reviewed the images with the patient.  The imaging study shows no fracture/dislocation.  Well maintained joint space.    Assessment: Pao Wood is a 33 year old female with right middle finger mass.    Plan:   I had a discussion with the patient regarding my clinical findings, diagnosis, and treatment plan. We reviewed the post-operative plan, frequency of follow-up, rehabilitation, and expected outcomes.  All questions answered.     WBAT right upper extremity but avoid the use of middle finger for    OK to wash hands/shower.  Do not submerge incision until 4 weeks from the date of surgery.   Reviewed finger ROM    Next Visit:   Follow-up: 3 week(s).   Imaging: None.   Plan: wound and symptom check.    GLENDA LE PA-C  Orthopaedic Surgery

## 2023-12-28 NOTE — PROGRESS NOTES
Chief Complaint:   Chief Complaint   Patient presents with    Surgical Followup     Follow up on excision of subcutaneous digit mass of right middle finger DOS 12/6/23     Referring Physician: Corine Chaney    Diagnosis: right middle finger mass   Surgery: 12/6/2023: right middle finger mass excision    History of Present Illness: Pao Wood is a 33 year old RHD female presenting for evaluation of right middle finger mass over the dorsal PIP. No trauma, not sure how it started. She first noticed it approximately 1 month ago. She has pain with full flexion and pain with lateral stress of finger.     Clinical documentation by KENAN Chaney on 10/16/2023 was reviewed.    11/28/2023: video visit for MRI review. Start time approximately 240pm, end time 247pm. Reports symptoms are the same    12/13/2023: first postoperative visit. Reports he has been well-controlled.  Her dressings off couple days ago when she is sleeping.  No numbness or tingling    1/3/2024: continued pain in the middle finger PIP dorsally when it bumps into things, no pain at rest    Occupation:     Physical Examination:   There were no vitals filed for this visit.  There is no height or weight on file to calculate BMI.    Well appearing, well nourished  Alert and oriented x 3, cooperative with exam     Right middle finger  Incision healing well without evidence of infection or recurrence, no TTP over PIP or evidence of mass recurrence, mildly thick scar  Fingertip touches DPC  Motor Exam: Intact TU/OK/x2-3  Sensory Exam: Sensation intact to light touch in FDWS (radial), volar IF (median), volar SF (ulnar), intact at tip of middle finger  Vascular Exam: 2+ radial pulse, < 2 sec capillary refill    Imaging/Studies:  Pathology 12/6/2023 shows: ganglion cyst    MRI right hand obtained 11/18/2023 shows:  1.  Small, 5-6 mm cystic lesion over the dorsal aspect of the third finger PIP joint, appears to dissect within the joint  capsule and the extensor tendon, and is suspicious for a small ganglion or tendon sheath cyst. There is additional mild T2 signal   intensity within the subcutaneous tissues over the dorsal aspect of the wrist, which may represent additional more ill-defined cystic change or edema. Clinical follow-up recommended. If this is symptomatic or enlarges, follow-up ultrasound could be   considered to ensure a simple cyst, and percutaneous aspiration could be considered if indicated.  2.  No solid nodule or mass identified.  3.  Normal bones and joints. No fracture, erosion, or marrow signal abnormality. Normal joint fluid with no significant arthritic changes.    XR (3 views) of the right middle finger was obtained  10/16/2023 .  I reviewed the images with the patient.  The imaging study shows no fracture/dislocation.  Well maintained joint space.    Assessment: Pao Wood is a 33 year old female with right middle finger mass.    Plan:   I had a discussion with the patient regarding my clinical findings, diagnosis, and treatment plan. We reviewed the post-operative plan, frequency of follow-up, rehabilitation, and expected outcomes.  All questions answered.     WBAT right upper extremity   Continue finger ROM exercises    Next Visit:   Follow-up: 6 weeks  - Imaging: none  -  Plan: symptom check    ZEFERINO FABIAN MD

## 2024-01-03 ENCOUNTER — OFFICE VISIT (OUTPATIENT)
Dept: ORTHOPEDICS | Facility: CLINIC | Age: 34
End: 2024-01-03
Payer: COMMERCIAL

## 2024-01-03 DIAGNOSIS — M67.442 GANGLION, FINGER JOINT OF LEFT HAND: Primary | ICD-10-CM

## 2024-01-03 PROCEDURE — 99024 POSTOP FOLLOW-UP VISIT: CPT | Performed by: STUDENT IN AN ORGANIZED HEALTH CARE EDUCATION/TRAINING PROGRAM

## 2024-01-03 NOTE — NURSING NOTE
Reason For Visit:   Chief Complaint   Patient presents with    Surgical Followup     Follow up on excision of subcutaneous digit mass of right middle finger DOS 12/6/23       Primary MD: Marissa Pedro  Ref. MD: yanira    Age: 33 year old    ?  No      LMP 05/01/2017       Pain Assessment  Patient Currently in Pain: Yes  0-10 Pain Scale: 1  Primary Pain Location: Finger (Comment which one) (right middle)  Pain Descriptors: Other (comment), Tightness, Nagging, Constant (bruised, swelling)  Alleviating Factors: NSAIDS    Hand Dominance Evaluation  Hand Dominance: Right          QuickDASH Assessment      11/1/2023     9:28 AM   QuickDASH Main   1. Open a tight or new jar Severe difficulty   2. Do heavy household chores (e.g., wash walls, floors) Severe difficulty   3. Carry a shopping bag or briefcase Moderate difficulty   4. Wash your back Mild difficulty   5. Use a knife to cut food Mild difficulty   6. Recreational activities in which you take some force or impact through your arm, shoulder or hand (e.g., golf, hammering, tennis, etc.) Moderate difficulty   7. During the past week, to what extent has your arm, shoulder or hand problem interfered with your normal social activities with family, friends, neighbours or groups Extremely   8. During the past week, were you limited in your work or other regular daily activities as a result of your arm, shoulder or hand problem Very limited   9. Arm, shoulder or hand pain Extreme   10.Tingling (pins and needles) in your arm,shoulder or hand Moderate   11. During the past week, how much difficulty have you had sleeping because of the pain in your arm, shoulder or hand Unable to answer   Quickdash Ability Score 54.55          Current Outpatient Medications   Medication Sig Dispense Refill    ibuprofen (ADVIL/MOTRIN) 200 MG tablet Take 600 mg by mouth      ibuprofen (ADVIL/MOTRIN) 800 MG tablet Take 1 tablet (800 mg) by mouth every 8 hours as needed for moderate  pain 30 tablet 1       Allergies   Allergen Reactions    Kahlua (Keoke Coffee) Flavor Difficulty breathing     Feels SOB    Latex Hives    Acetaminophen Nausea, Other (See Comments) and Nausea and Vomiting     Fever and Nausea  Fever (when she was a small child).      Aspirin Nausea, Other (See Comments) and Nausea and Vomiting     Nausea and Fever  Fever (when she was a small child)      No Clinical Screening - See Comments Other (See Comments)     kahlua chest feels like its caving in sob    Spinach Unknown and Nausea and Vomiting       Mary Waddell, ATC

## 2024-01-03 NOTE — LETTER
1/3/2024         RE: Pao Wood  [address] [facility] [address]        Dear Colleague,    Thank you for referring your patient, Pao Wood, to the [facility] [place]. Please see a copy of my visit note below.    Chief Complaint:   Chief Complaint   Patient presents with    Surgical Followup     Follow up on excision of subcutaneous digit mass of right middle finger DOS 12/6/23     Referring Physician: Corine Chaney    Diagnosis: right middle finger mass   Surgery: 12/6/2023: right middle finger mass excision    History of Present Illness: Pao Wood is a 33 year old RHD female presenting for evaluation of right middle finger mass over the dorsal PIP. No trauma, not sure how it started. She first noticed it approximately 1 month ago. She has pain with full flexion and pain with lateral stress of finger.     Clinical documentation by KENAN Chaney on 10/16/2023 was reviewed.    11/28/2023: video visit for MRI review. Start time approximately 240pm, end time 247pm. Reports symptoms are the same    12/13/2023: first postoperative visit. Reports he has been well-controlled.  Her dressings off couple days ago when she is sleeping.  No numbness or tingling    1/3/2024: continued pain in the middle finger PIP dorsally when it bumps into things, no pain at rest    Occupation:     Physical Examination:   There were no vitals filed for this visit.  There is no height or weight on file to calculate BMI.    Well appearing, well nourished  Alert and oriented x 3, cooperative with exam     Right middle finger  Incision healing well without evidence of infection or recurrence, no TTP over PIP or evidence of mass recurrence, mildly thick scar  Fingertip touches DPC  Motor Exam: Intact TU/OK/x2-3  Sensory Exam: Sensation intact to light touch in FDWS (radial), volar IF (median), volar SF (ulnar), intact at tip of middle finger  Vascular Exam: 2+  radial pulse, < 2 sec capillary refill    Imaging/Studies:  Pathology 12/6/2023 shows: ganglion cyst    MRI right hand obtained 11/18/2023 shows:  1.  Small, 5-6 mm cystic lesion over the dorsal aspect of the third finger PIP joint, appears to dissect within the joint capsule and the extensor tendon, and is suspicious for a small ganglion or tendon sheath cyst. There is additional mild T2 signal   intensity within the subcutaneous tissues over the dorsal aspect of the wrist, which may represent additional more ill-defined cystic change or edema. Clinical follow-up recommended. If this is symptomatic or enlarges, follow-up ultrasound could be   considered to ensure a simple cyst, and percutaneous aspiration could be considered if indicated.  2.  No solid nodule or mass identified.  3.  Normal bones and joints. No fracture, erosion, or marrow signal abnormality. Normal joint fluid with no significant arthritic changes.    XR (3 views) of the right middle finger was obtained  10/16/2023 .  I reviewed the images with the patient.  The imaging study shows no fracture/dislocation.  Well maintained joint space.    Assessment: Pao Wood is a 33 year old female with right middle finger mass.    Plan:   I had a discussion with the patient regarding my clinical findings, diagnosis, and treatment plan. We reviewed the post-operative plan, frequency of follow-up, rehabilitation, and expected outcomes.  All questions answered.     WBAT right upper extremity   Continue finger ROM exercises    Next Visit:   Follow-up: 6 weeks  - Imaging: none  -  Plan: symptom check    ZEFERINO FABIAN MD

## 2024-02-09 ENCOUNTER — TELEPHONE (OUTPATIENT)
Dept: FAMILY MEDICINE | Facility: CLINIC | Age: 34
End: 2024-02-09
Payer: COMMERCIAL

## 2024-02-09 NOTE — TELEPHONE ENCOUNTER
Patient Quality Outreach    Patient is due for the following:   Cervical Cancer Screening - PAP Needed    Next Steps:   Pap was permitaly removed from HM    Type of outreach:    none      Questions for provider review:    None           Brennon Flores

## 2024-02-09 NOTE — LETTER
February 9, 2024    To  Pao Wood  [address] [facility] [address]    Your team at [facility] cares about your health. We have reviewed your chart and based on our findings; we are making the following recommendations to better manage your health.     You are in particular need of attention regarding the following:     Schedule a primary care office visit with your provider for a Pap Smear to screen for Cervical Cancer.    If you have already completed these items, please contact the clinic via phone or   [facility] so your care team can review and update your records. Thank you for   choosing [facility] for your healthcare needs. For any questions,   concerns, or to schedule an appointment please contact our clinic.    Healthy Regards,      Your [facility] Care Team

## 2024-04-16 ENCOUNTER — PATIENT OUTREACH (OUTPATIENT)
Dept: CARE COORDINATION | Facility: CLINIC | Age: 34
End: 2024-04-16
Payer: COMMERCIAL

## 2024-04-30 ENCOUNTER — PATIENT OUTREACH (OUTPATIENT)
Dept: CARE COORDINATION | Facility: CLINIC | Age: 34
End: 2024-04-30
Payer: COMMERCIAL

## 2024-05-13 ENCOUNTER — MYC MEDICAL ADVICE (OUTPATIENT)
Dept: FAMILY MEDICINE | Facility: CLINIC | Age: 34
End: 2024-05-13
Payer: COMMERCIAL

## 2024-05-13 NOTE — TELEPHONE ENCOUNTER
Patient Quality Outreach    Patient is due for the following:   Depression  -  Depression follow-up visit    Next Steps:   Schedule a office visit for depression follow-up    Type of outreach:    Sent [employer] message.    Questions for provider review:    None       Corine Chaney NP

## 2024-07-07 ENCOUNTER — HEALTH MAINTENANCE LETTER (OUTPATIENT)
Age: 34
End: 2024-07-07

## 2024-11-12 ENCOUNTER — PATIENT OUTREACH (OUTPATIENT)
Dept: CARE COORDINATION | Facility: CLINIC | Age: 34
End: 2024-11-12
Payer: COMMERCIAL

## 2025-07-19 ENCOUNTER — HEALTH MAINTENANCE LETTER (OUTPATIENT)
Age: 35
End: 2025-07-19

## (undated) DEVICE — CAST PADDING 3" STERILE 9043S

## (undated) DEVICE — SUCTION MANIFOLD NEPTUNE 2 SYS 1 PORT 702-025-000

## (undated) DEVICE — DRSG STERI STRIP 1/2X4" R1547

## (undated) DEVICE — DRAPE STERI TOWEL LG 1010

## (undated) DEVICE — GLOVE BIOGEL PI MICRO INDICATOR UNDERGLOVE SZ 6.0 48960

## (undated) DEVICE — PACK HAND CUSTOM ASC

## (undated) DEVICE — SOL WATER IRRIG 500ML BOTTLE 2F7113

## (undated) DEVICE — SU MONOCRYL 4-0 PS-2 27" UND Y426H

## (undated) DEVICE — PREP CHLORAPREP 26ML TINTED ORANGE  260815

## (undated) DEVICE — LINEN ORTHO PACK 5446

## (undated) DEVICE — SOL NACL 0.9% IRRIG 500ML BOTTLE 2F7123

## (undated) DEVICE — COVER CAMERA IN-LIGHT DISP LT-C02

## (undated) RX ORDER — BUPIVACAINE HYDROCHLORIDE 5 MG/ML
INJECTION, SOLUTION EPIDURAL; INTRACAUDAL
Status: DISPENSED
Start: 2023-12-06

## (undated) RX ORDER — LIDOCAINE HYDROCHLORIDE 10 MG/ML
INJECTION, SOLUTION EPIDURAL; INFILTRATION; INTRACAUDAL; PERINEURAL
Status: DISPENSED
Start: 2023-12-06